# Patient Record
Sex: FEMALE | Race: BLACK OR AFRICAN AMERICAN | NOT HISPANIC OR LATINO | Employment: FULL TIME | URBAN - METROPOLITAN AREA
[De-identification: names, ages, dates, MRNs, and addresses within clinical notes are randomized per-mention and may not be internally consistent; named-entity substitution may affect disease eponyms.]

---

## 2023-08-31 DIAGNOSIS — I10 PRIMARY HYPERTENSION: Primary | ICD-10-CM

## 2023-08-31 RX ORDER — AMLODIPINE BESYLATE 5 MG/1
5 TABLET ORAL DAILY
Qty: 30 TABLET | Refills: 0 | Status: SHIPPED | OUTPATIENT
Start: 2023-08-31 | End: 2023-10-12

## 2023-10-12 ENCOUNTER — OFFICE VISIT (OUTPATIENT)
Dept: PRIMARY CARE | Facility: CLINIC | Age: 68
End: 2023-10-12
Payer: COMMERCIAL

## 2023-10-12 VITALS
TEMPERATURE: 97.7 F | HEIGHT: 61 IN | DIASTOLIC BLOOD PRESSURE: 102 MMHG | SYSTOLIC BLOOD PRESSURE: 153 MMHG | HEART RATE: 74 BPM | BODY MASS INDEX: 29.77 KG/M2 | WEIGHT: 157.7 LBS | OXYGEN SATURATION: 100 %

## 2023-10-12 DIAGNOSIS — L50.8 CHRONIC URTICARIA: ICD-10-CM

## 2023-10-12 DIAGNOSIS — Z12.31 BREAST CANCER SCREENING BY MAMMOGRAM: ICD-10-CM

## 2023-10-12 DIAGNOSIS — R04.0 EPISTAXIS: ICD-10-CM

## 2023-10-12 DIAGNOSIS — I10 PRIMARY HYPERTENSION: ICD-10-CM

## 2023-10-12 DIAGNOSIS — Z12.11 ENCOUNTER FOR SCREENING COLONOSCOPY: ICD-10-CM

## 2023-10-12 DIAGNOSIS — M79.644 PAIN OF RIGHT MIDDLE FINGER: ICD-10-CM

## 2023-10-12 DIAGNOSIS — R20.2 PARESTHESIA OF BOTH HANDS: ICD-10-CM

## 2023-10-12 DIAGNOSIS — I10 HYPERTENSION, UNSPECIFIED TYPE: Primary | ICD-10-CM

## 2023-10-12 DIAGNOSIS — E78.5 HYPERLIPIDEMIA, UNSPECIFIED HYPERLIPIDEMIA TYPE: ICD-10-CM

## 2023-10-12 PROCEDURE — 1036F TOBACCO NON-USER: CPT

## 2023-10-12 PROCEDURE — 1126F AMNT PAIN NOTED NONE PRSNT: CPT

## 2023-10-12 PROCEDURE — 1170F FXNL STATUS ASSESSED: CPT

## 2023-10-12 PROCEDURE — 3080F DIAST BP >= 90 MM HG: CPT

## 2023-10-12 PROCEDURE — 1159F MED LIST DOCD IN RCRD: CPT

## 2023-10-12 PROCEDURE — 3077F SYST BP >= 140 MM HG: CPT

## 2023-10-12 PROCEDURE — 99214 OFFICE O/P EST MOD 30 MIN: CPT

## 2023-10-12 RX ORDER — ACETAMINOPHEN 500 MG
TABLET ORAL
Qty: 3.214 KIT | Refills: 0 | Status: SHIPPED | OUTPATIENT
Start: 2023-10-12 | End: 2024-03-11 | Stop reason: SDUPTHER

## 2023-10-12 RX ORDER — AMLODIPINE BESYLATE 10 MG/1
10 TABLET ORAL DAILY
Qty: 30 TABLET | Refills: 11 | Status: SHIPPED | OUTPATIENT
Start: 2023-10-12 | End: 2024-10-11

## 2023-10-12 RX ORDER — ATORVASTATIN CALCIUM 20 MG/1
20 TABLET, FILM COATED ORAL NIGHTLY
Qty: 90 TABLET | Refills: 3 | Status: SHIPPED | OUTPATIENT
Start: 2023-10-12 | End: 2024-05-16 | Stop reason: SDUPTHER

## 2023-10-12 RX ORDER — ATORVASTATIN CALCIUM 20 MG/1
20 TABLET, FILM COATED ORAL NIGHTLY
COMMUNITY
End: 2023-10-12 | Stop reason: SDUPTHER

## 2023-10-12 RX ORDER — ARM BRACE
EACH MISCELLANEOUS
Qty: 3.214 EACH | Refills: 0 | Status: SHIPPED | OUTPATIENT
Start: 2023-10-12 | End: 2024-05-16

## 2023-10-12 RX ORDER — LEVOCETIRIZINE DIHYDROCHLORIDE 5 MG/1
5 TABLET, FILM COATED ORAL 2 TIMES DAILY PRN
Qty: 30 TABLET | Refills: 2 | Status: SHIPPED | OUTPATIENT
Start: 2023-10-12 | End: 2023-12-11

## 2023-10-12 ASSESSMENT — PAIN SCALES - GENERAL: PAINLEVEL: 0-NO PAIN

## 2023-10-12 ASSESSMENT — PATIENT HEALTH QUESTIONNAIRE - PHQ9
1. LITTLE INTEREST OR PLEASURE IN DOING THINGS: NOT AT ALL
2. FEELING DOWN, DEPRESSED OR HOPELESS: NOT AT ALL
SUM OF ALL RESPONSES TO PHQ9 QUESTIONS 1 AND 2: 0

## 2023-10-12 ASSESSMENT — ENCOUNTER SYMPTOMS
DEPRESSION: 1
ALLERGIC REACTION: 1
LOSS OF SENSATION IN FEET: 0
OCCASIONAL FEELINGS OF UNSTEADINESS: 0

## 2023-10-12 ASSESSMENT — ACTIVITIES OF DAILY LIVING (ADL)
TAKING_MEDICATION: INDEPENDENT
GROCERY_SHOPPING: INDEPENDENT
DOING_HOUSEWORK: INDEPENDENT
BATHING: INDEPENDENT
MANAGING_FINANCES: INDEPENDENT
DRESSING: INDEPENDENT

## 2023-10-12 ASSESSMENT — COLUMBIA-SUICIDE SEVERITY RATING SCALE - C-SSRS
1. IN THE PAST MONTH, HAVE YOU WISHED YOU WERE DEAD OR WISHED YOU COULD GO TO SLEEP AND NOT WAKE UP?: NO
6. HAVE YOU EVER DONE ANYTHING, STARTED TO DO ANYTHING, OR PREPARED TO DO ANYTHING TO END YOUR LIFE?: NO
2. HAVE YOU ACTUALLY HAD ANY THOUGHTS OF KILLING YOURSELF?: NO

## 2023-10-12 NOTE — PROGRESS NOTES
"Subjective   Patient ID: Jaky Kaufman is a 68 y.o. female w/ Hx of chronic urticaria/angioedema, HTN, hyperlipidemia who presents for Epistaxis (Nose Bleed), Allergic Reaction, and Med Refill (Discuss medications).    Epistaxis (Nose Bleed)     Allergic Reaction    Med Refill       #HTN  - BP /102  - Currently taking amlodipine 5mg daily   - Not checking BP at home, requesting monitor kit   - Denies sx     #Epistaxis   - for past 2 months   - used to get every other day, no specific trigger, random onset, each episode last for 2-3 minutes, stopped with pinching and use of klenex. Now is becoming less frequent, last episode a week ago.   - Bright red blood, no clots   - Occasional headache but not always   - reports of dry nose and congestion (uses OTC nasal spray/vicks)   - No trauma/injury     #Chronic urticaria   - last seen allergist in Jan 2022   - \"Chronic urticaria and Angioedema: Chronic Urticaria: We discussed that the cause of chronic urticaria is immune-mediated, in some cases caused by antibodies targeted against self IgE-receptor or self-IgE. Chronic urticaria is less likely to be caused by environmental or food allergy. Triggers can include use of NSAIDS, alcohol, infection, stress, etc. Recommend starting Levocetirtizine 5 mg BID, Pepcid 20 mg BID, and may use Hydroxyzine 25-50 mg every 6-8 hours as needed for breakthrough hives. We also discussed that she should should stay up to date with age-appropriate cancer screenings, as underlying malignancy can also be a cause of urticaria. I have also counseled that she should avoid NSAIDS, aspirin, and narcotic pain medications as these medications can worsen or prolong hives through the mechanism of non-specific mast cell degranulation. She will discontinue use of Aleve. Will check labs to include CBC, CMP, TSH, CU index, serum tryptase.   Chronic rhinitis: Will check environmental aeroallergens specific IgE panel to further evaluate. May start " "Flonase 2 sprays each nostril once daily  We would like to see her in follow up in 3 months or sooner if needed\"  Pt didn't follow up as recommended.   Pt not taking any of the above medication as recommended   Endorses itchiness on the roof of her mouth   Chronic itchiness (generalised)   No new rash/hives   Denies lip swelling/SOB     #Right middle finger pain   Intermittent tingling sensation in her finger (both hands, right >left, intermittent pain in her right middle finger with mild swelling). No numbness/change in colour. No injury/trauma.     #HM  - Colonoscopy in 2018 with multiple polyp. Recommended repeat in 3 years, past over due   - Mammogram in 2021 -> wnl. Due for repeat   - Due for PAP   - UTD flu shot     ROS:  12pt ROS negative except as mentioned in HPI     Objective   BP (!) 153/102 (BP Location: Right arm, Patient Position: Sitting, BP Cuff Size: Adult)   Pulse 74   Temp 36.5 °C (97.7 °F) (Temporal)   Ht 1.54 m (5' 0.63\")   Wt 71.5 kg (157 lb 11.2 oz)   SpO2 100%   BMI 30.16 kg/m²     Physical Exam  Constitutional:       Appearance: Normal appearance.   HENT:      Mouth/Throat:      Mouth: Mucous membranes are moist.      Comments: No erythema/swelling/lesion  Eyes:      Extraocular Movements: Extraocular movements intact.      Conjunctiva/sclera: Conjunctivae normal.   Cardiovascular:      Rate and Rhythm: Normal rate and regular rhythm.      Pulses: Normal pulses.      Heart sounds: Normal heart sounds.   Pulmonary:      Effort: Pulmonary effort is normal.      Breath sounds: Normal breath sounds.   Abdominal:      General: Abdomen is flat. Bowel sounds are normal.      Palpations: Abdomen is soft.   Musculoskeletal:      Cervical back: Normal range of motion.      Comments: Right middle finger: mild swelling, no redness/tenderness on palpation. Good strength but unable to fully close the fist.       Skin:     General: Skin is warm.   Neurological:      Mental Status: She is alert and " oriented to person, place, and time.        Assessment/Plan   Jaky Kaufman is a 68 y.o. female w/ Hx of chronic urticaria/angioedema, HTN, hyperlipidemia who presents for multiple complaints. Clinically stable. Plan as below:     #HTN  - BP above target range  - Increased dose of amlodipine to 10mg daily  - BP monitor ordered, advised to check BP regularly at home   - Discussed diet and exercise   - labs ordered including CMP, urine albumin     #Epistaxis   - Not on blood thinner, no trauma  - Could be in the setting of poorly controlled HTN vs dry nose   - Episodes are becoming less frequent, easily controlled (lasting less than 5 minutes)   - Advised to use humidifier to keep nose moist   - Stay hydrated   - Will order CBC to check Hgb level   - Return precautions discussed     #Chronic urticaria   - no acute concern  - Pt needs to follow up with allergy specialist, referral provided  - Ordered levocetirizine 5mg BID for PRN use for chronic pruritus   - CMP ordered to check BUN/LFT to r/o other causes of generalised pruritus     #Right middle finger pain/tingling sensation  - Paresthesia in both hands but intermittent sharp pain only present at right middle finger  - No tenderness on palpation today, no warmth or erythema to suggest acute joint infection or inflammation   - Unlikely trigger finger  - Pt's work involves physical lifting, sx could be related to carpal tunnel syndrome. Ordered wrist brace. Will continue to monitor sx     #HM  - Ordered colonoscopy and mammogram     RTC for HMV, PAP and BP review.      Seen and discussed with Dr Naomy Medellin MD  Family Medicine PGY2     Problem List Items Addressed This Visit    None  Visit Diagnoses         Codes    Hypertension, unspecified type    -  Primary I10    Relevant Medications    blood pressure monitor (Blood Pressure Kit) kit    amLODIPine (Norvasc) 10 mg tablet    Other Relevant Orders    Referral to Ophthalmology    Comprehensive metabolic  panel    Albumin, urine, random    Primary hypertension     I10    Encounter for screening colonoscopy     Z12.11    Relevant Orders    Colonoscopy Screening    Breast cancer screening by mammogram     Z12.31    Relevant Orders    BI mammo bilateral screening tomosynthesis    Pain of right middle finger     M79.644    Relevant Medications    arm brace (Wrist Brace) misc    Paresthesia of both hands     R20.2    Relevant Orders    Vitamin B12    Epistaxis     R04.0    Chronic urticaria     L50.8    Relevant Medications    levocetirizine (Xyzal) 5 mg tablet    Other Relevant Orders    CBC and Auto Differential    Comprehensive metabolic panel    Referral to Allergy    Hyperlipidemia, unspecified hyperlipidemia type     E78.5    Relevant Medications    atorvastatin (Lipitor) 20 mg tablet    Other Relevant Orders    Lipid panel

## 2023-10-13 NOTE — PROGRESS NOTES
I saw and evaluated the patient. I personally obtained the key and critical portions of the history and physical exam or was physically present for key and critical portions performed by the resident/fellow. I reviewed the resident/fellow's documentation and discussed the patient with the resident/fellow. I agree with the resident/fellow's medical decision making as documented in the note.    Miguel Moralez MD

## 2023-10-23 ENCOUNTER — LAB (OUTPATIENT)
Dept: LAB | Facility: LAB | Age: 68
End: 2023-10-23
Payer: COMMERCIAL

## 2023-10-23 DIAGNOSIS — E78.5 HYPERLIPIDEMIA, UNSPECIFIED HYPERLIPIDEMIA TYPE: ICD-10-CM

## 2023-10-23 DIAGNOSIS — I10 HYPERTENSION, UNSPECIFIED TYPE: ICD-10-CM

## 2023-10-23 DIAGNOSIS — R20.2 PARESTHESIA OF BOTH HANDS: ICD-10-CM

## 2023-10-23 DIAGNOSIS — L50.8 CHRONIC URTICARIA: ICD-10-CM

## 2023-10-23 LAB
ALBUMIN SERPL BCP-MCNC: 4.2 G/DL (ref 3.4–5)
ALP SERPL-CCNC: 121 U/L (ref 33–136)
ALT SERPL W P-5'-P-CCNC: 11 U/L (ref 7–45)
ANION GAP SERPL CALC-SCNC: 12 MMOL/L (ref 10–20)
AST SERPL W P-5'-P-CCNC: 22 U/L (ref 9–39)
BASOPHILS # BLD AUTO: 0.03 X10*3/UL (ref 0–0.1)
BASOPHILS NFR BLD AUTO: 0.6 %
BILIRUB SERPL-MCNC: 0.6 MG/DL (ref 0–1.2)
BUN SERPL-MCNC: 12 MG/DL (ref 6–23)
CALCIUM SERPL-MCNC: 9.7 MG/DL (ref 8.6–10.6)
CHLORIDE SERPL-SCNC: 107 MMOL/L (ref 98–107)
CHOLEST SERPL-MCNC: 161 MG/DL (ref 0–199)
CHOLESTEROL/HDL RATIO: 3
CO2 SERPL-SCNC: 30 MMOL/L (ref 21–32)
CREAT SERPL-MCNC: 0.81 MG/DL (ref 0.5–1.05)
CREAT UR-MCNC: 224 MG/DL (ref 20–320)
EOSINOPHIL # BLD AUTO: 0.24 X10*3/UL (ref 0–0.7)
EOSINOPHIL NFR BLD AUTO: 4.5 %
ERYTHROCYTE [DISTWIDTH] IN BLOOD BY AUTOMATED COUNT: 15.6 % (ref 11.5–14.5)
GFR SERPL CREATININE-BSD FRML MDRD: 80 ML/MIN/1.73M*2
GLUCOSE SERPL-MCNC: 84 MG/DL (ref 74–99)
HCT VFR BLD AUTO: 39.3 % (ref 36–46)
HDLC SERPL-MCNC: 54.1 MG/DL
HGB BLD-MCNC: 12.1 G/DL (ref 12–16)
IMM GRANULOCYTES # BLD AUTO: 0.01 X10*3/UL (ref 0–0.7)
IMM GRANULOCYTES NFR BLD AUTO: 0.2 % (ref 0–0.9)
LDLC SERPL CALC-MCNC: 96 MG/DL
LYMPHOCYTES # BLD AUTO: 2.2 X10*3/UL (ref 1.2–4.8)
LYMPHOCYTES NFR BLD AUTO: 41.1 %
MCH RBC QN AUTO: 27 PG (ref 26–34)
MCHC RBC AUTO-ENTMCNC: 30.8 G/DL (ref 32–36)
MCV RBC AUTO: 88 FL (ref 80–100)
MICROALBUMIN UR-MCNC: 17.5 MG/L
MICROALBUMIN/CREAT UR: 7.8 UG/MG CREAT
MONOCYTES # BLD AUTO: 0.42 X10*3/UL (ref 0.1–1)
MONOCYTES NFR BLD AUTO: 7.9 %
NEUTROPHILS # BLD AUTO: 2.45 X10*3/UL (ref 1.2–7.7)
NEUTROPHILS NFR BLD AUTO: 45.7 %
NON HDL CHOLESTEROL: 107 MG/DL (ref 0–149)
NRBC BLD-RTO: 0 /100 WBCS (ref 0–0)
PLATELET # BLD AUTO: 278 X10*3/UL (ref 150–450)
PMV BLD AUTO: 9.5 FL (ref 7.5–11.5)
POTASSIUM SERPL-SCNC: 4.1 MMOL/L (ref 3.5–5.3)
PROT SERPL-MCNC: 7.6 G/DL (ref 6.4–8.2)
RBC # BLD AUTO: 4.48 X10*6/UL (ref 4–5.2)
SODIUM SERPL-SCNC: 145 MMOL/L (ref 136–145)
TRIGL SERPL-MCNC: 57 MG/DL (ref 0–149)
VIT B12 SERPL-MCNC: 708 PG/ML (ref 211–911)
VLDL: 11 MG/DL (ref 0–40)
WBC # BLD AUTO: 5.4 X10*3/UL (ref 4.4–11.3)

## 2023-10-23 PROCEDURE — 82607 VITAMIN B-12: CPT

## 2023-10-23 PROCEDURE — 80061 LIPID PANEL: CPT

## 2023-10-23 PROCEDURE — 85025 COMPLETE CBC W/AUTO DIFF WBC: CPT

## 2023-10-23 PROCEDURE — 36415 COLL VENOUS BLD VENIPUNCTURE: CPT

## 2023-10-23 PROCEDURE — 82043 UR ALBUMIN QUANTITATIVE: CPT

## 2023-10-23 PROCEDURE — 82570 ASSAY OF URINE CREATININE: CPT

## 2023-10-23 PROCEDURE — 80053 COMPREHEN METABOLIC PANEL: CPT

## 2023-10-26 ENCOUNTER — HOSPITAL ENCOUNTER (OUTPATIENT)
Dept: RADIOLOGY | Facility: HOSPITAL | Age: 68
Discharge: HOME | End: 2023-10-26
Payer: COMMERCIAL

## 2023-10-26 DIAGNOSIS — Z12.31 BREAST CANCER SCREENING BY MAMMOGRAM: ICD-10-CM

## 2023-10-26 PROCEDURE — 77067 SCR MAMMO BI INCL CAD: CPT

## 2023-10-26 PROCEDURE — 77063 BREAST TOMOSYNTHESIS BI: CPT | Performed by: RADIOLOGY

## 2023-10-26 PROCEDURE — 77067 SCR MAMMO BI INCL CAD: CPT | Performed by: RADIOLOGY

## 2023-11-15 ENCOUNTER — OFFICE VISIT (OUTPATIENT)
Dept: ALLERGY | Facility: HOSPITAL | Age: 68
End: 2023-11-15
Payer: COMMERCIAL

## 2023-11-15 VITALS
RESPIRATION RATE: 20 BRPM | BODY MASS INDEX: 30.16 KG/M2 | OXYGEN SATURATION: 100 % | WEIGHT: 157.7 LBS | DIASTOLIC BLOOD PRESSURE: 80 MMHG | HEART RATE: 84 BPM | SYSTOLIC BLOOD PRESSURE: 115 MMHG | TEMPERATURE: 97.6 F

## 2023-11-15 DIAGNOSIS — L50.8 CHRONIC URTICARIA: ICD-10-CM

## 2023-11-15 DIAGNOSIS — J31.0 CHRONIC RHINITIS: Primary | ICD-10-CM

## 2023-11-15 PROCEDURE — 99214 OFFICE O/P EST MOD 30 MIN: CPT | Performed by: ALLERGY & IMMUNOLOGY

## 2023-11-15 PROCEDURE — 1126F AMNT PAIN NOTED NONE PRSNT: CPT | Performed by: ALLERGY & IMMUNOLOGY

## 2023-11-15 PROCEDURE — 1159F MED LIST DOCD IN RCRD: CPT | Performed by: ALLERGY & IMMUNOLOGY

## 2023-11-15 PROCEDURE — 1036F TOBACCO NON-USER: CPT | Performed by: ALLERGY & IMMUNOLOGY

## 2023-11-15 RX ORDER — FLUTICASONE PROPIONATE 50 MCG
2 SPRAY, SUSPENSION (ML) NASAL DAILY
Qty: 16 G | Refills: 11 | Status: SHIPPED | OUTPATIENT
Start: 2023-11-15 | End: 2024-11-14

## 2023-11-15 RX ORDER — HYDROCORTISONE ACETATE PRAMOXINE HCL 2.5; 1 G/100G; G/100G
CREAM TOPICAL
COMMUNITY
Start: 2018-07-16

## 2023-11-15 RX ORDER — FLUTICASONE PROPIONATE 50 MCG
2 SPRAY, SUSPENSION (ML) NASAL DAILY
COMMUNITY
Start: 2022-01-12

## 2023-11-15 RX ORDER — AMLODIPINE BESYLATE 5 MG/1
5 TABLET ORAL DAILY
COMMUNITY
End: 2023-11-15 | Stop reason: ALTCHOICE

## 2023-11-15 RX ORDER — ASPIRIN 81 MG/1
TABLET ORAL
COMMUNITY
Start: 2018-07-16 | End: 2023-11-15 | Stop reason: ALTCHOICE

## 2023-11-15 RX ORDER — FAMOTIDINE 20 MG/1
1 TABLET, FILM COATED ORAL 2 TIMES DAILY
COMMUNITY
Start: 2020-10-14

## 2023-11-15 RX ORDER — MINERAL OIL
180 ENEMA (ML) RECTAL DAILY
Qty: 30 TABLET | Refills: 11 | Status: SHIPPED | OUTPATIENT
Start: 2023-11-15 | End: 2024-11-14

## 2023-11-15 ASSESSMENT — ENCOUNTER SYMPTOMS
NUMBNESS: 1
RHINORRHEA: 1
ALLERGIC/IMMUNOLOGIC NEGATIVE: 1
GASTROINTESTINAL NEGATIVE: 1
EYES NEGATIVE: 1
CONSTITUTIONAL NEGATIVE: 1
RESPIRATORY NEGATIVE: 1
HEMATOLOGIC/LYMPHATIC NEGATIVE: 1
MUSCULOSKELETAL NEGATIVE: 1
CARDIOVASCULAR NEGATIVE: 1

## 2023-11-15 NOTE — PROGRESS NOTES
Jaky Kaufman presents for follow up evaluation today.  She was last seen for chronic urticaria and chronic rhinitis in January 2022.      She provide the following history:    She states that she is itchy all the time.  She notes that she rarely gets hives now, but feels that her skin is always itchy, predominantly on her arms and legs.  She scratches until she scars.  She is using Dove soap, and all Free and clear laundry detergent.  She uses a cream moisturizer for which she cannot recall the name.  She used to take Aleve on occasion but now has switched to Tylenol.  She does not think that increasing her blood pressure medication worsened her itching.  She is on levocetirizine, however makes her somewhat drowsy.  She does not recall using Allegra in the past.  She is out of hydroxyzine.  She also notes that ingestion of fresh pineapple and fresh tomatoes bother her mouth.  She is able to tolerate canned pineapple and cooked tomato.  And overall she notes that she has a runny nose all of this time.    Review of Systems   Constitutional: Negative.    HENT:  Positive for rhinorrhea.    Eyes: Negative.    Respiratory: Negative.     Cardiovascular: Negative.    Gastrointestinal: Negative.    Musculoskeletal: Negative.    Skin: Negative.    Allergic/Immunologic: Negative.    Neurological:  Positive for numbness.        Tingling sensation up her legs   Hematological: Negative.        Vital signs:  /80 (BP Location: Right arm, Patient Position: Sitting)   Pulse 84   Temp 36.4 °C (97.6 °F)   Resp 20   Wt 71.5 kg (157 lb 11.2 oz)   SpO2 100%   BMI 30.16 kg/m²     GENERAL: Alert, oriented and in no acute distress.     HEENT: EYES: No conjunctival injection or cobblestoning. Nose: nasal turbinates are edematous bilaterally.  There is no mucous stranding, polyps, or blood    noted. EARS: Tympanic membranes are clear. MOUTH: moist and pink with no exudates, ulcers, or thrush. NECK: is supple, without adenopathy.   No upper airway stridor noted.       HEART: regular rate and rhythm.       LUNGS: Clear to auscultation bilaterally. No wheezing, rhonchi or rales.        ABDOMEN: Positive bowel sounds, soft, nontender, nondistended.       EXTREMITIES: No clubbing or edema.        SKIN: No rash, hives, or angioedema noted    Impression:   1. Chronic rhinitis    2. Chronic urticaria       Assessment and plan:    Chronic urticaria and chronic pruritus: Autoimmune with positive CU index (1/2022).  Recommend starting fexofenadine 180 mg daily, and may use levocetirizine 5 mg in the evening as needed.  Recommend using Dove sensitive skin soap and CeraVe healing ointment.  Recommend staying up-to-date with age-appropriate cancer screenings.    Chronic rhinitis:  Respiratory allergy specific IgE panel was negative in 1/2022.  Discussed that she has nonallergic rhinitis.  Restart Flonase 2 sprays each nostril once daily.  We reviewed proper nasal spray administration technique    Plan for follow-up in 6 months or sooner if needed

## 2023-11-15 NOTE — PATIENT INSTRUCTIONS
It was to see you today!    You can take Allegra (Fexofenadine) 180 mg once daily in the morning     Take Levocetirizine 5 mg once daily in the evening     You may use Flonase 2 sprays each nostril once daily    Use Dove Sensitive Skin Soap and CeraVe healing ointment     We would like to see you in follow up in 6 months or sooner if needed

## 2023-11-15 NOTE — LETTER
November 15, 2023     Dick Medellin MD  [id number] [facility] [id number]    Patient: Jaky Kaufman   YOB: 1955   Date of Visit: 11/15/2023       Dear Dr. Dick Medellin MD:    Thank you for referring Jaky Kaufman to me for evaluation. Below are my notes for this consultation.  If you have questions, please do not hesitate to call me. I look forward to following your patient along with you.       Sincerely,     Princess SOCO Garnett MD      CC: No Recipients  ______________________________________________________________________________________    Jaky Kaufman presents for follow up evaluation today.  She was last seen for chronic urticaria and chronic rhinitis in January 2022.      She provide the following history:    She states that she is itchy all the time.  She notes that she rarely gets hives now, but feels that her skin is always itchy, predominantly on her arms and legs.  She scratches until she scars.  She is using Dove soap, and all Free and clear laundry detergent.  She uses a cream moisturizer for which she cannot recall the name.  She used to take Aleve on occasion but now has switched to Tylenol.  She does not think that increasing her blood pressure medication worsened her itching.  She is on levocetirizine, however makes her somewhat drowsy.  She does not recall using Allegra in the past.  She is out of hydroxyzine.  She also notes that ingestion of fresh pineapple and fresh tomatoes bother her mouth.  She is able to tolerate canned pineapple and cooked tomato.  And overall she notes that she has a runny nose all of this time.    Review of Systems   Constitutional: Negative.    HENT:  Positive for rhinorrhea.    Eyes: Negative.    Respiratory: Negative.     Cardiovascular: Negative.    Gastrointestinal: Negative.    Musculoskeletal: Negative.    Skin: Negative.    Allergic/Immunologic: Negative.    Neurological:  Positive for numbness.         Tingling sensation up her legs   Hematological: Negative.        Vital signs:  /80 (BP Location: Right arm, Patient Position: Sitting)   Pulse 84   Temp 36.4 °C (97.6 °F)   Resp 20   Wt 71.5 kg (157 lb 11.2 oz)   SpO2 100%   BMI 30.16 kg/m²     GENERAL: Alert, oriented and in no acute distress.     HEENT: EYES: No conjunctival injection or cobblestoning. Nose: nasal turbinates are edematous bilaterally.  There is no mucous stranding, polyps, or blood    noted. EARS: Tympanic membranes are clear. MOUTH: moist and pink with no exudates, ulcers, or thrush. NECK: is supple, without adenopathy.  No upper airway stridor noted.       HEART: regular rate and rhythm.       LUNGS: Clear to auscultation bilaterally. No wheezing, rhonchi or rales.        ABDOMEN: Positive bowel sounds, soft, nontender, nondistended.       EXTREMITIES: No clubbing or edema.        SKIN: No rash, hives, or angioedema noted    Impression:   1. Chronic rhinitis    2. Chronic urticaria       Assessment and plan:    Chronic urticaria and chronic pruritus: Autoimmune with positive CU index (1/2022).  Recommend starting fexofenadine 180 mg daily, and may use levocetirizine 5 mg in the evening as needed.  Recommend using Dove sensitive skin soap and CeraVe healing ointment.  Recommend staying up-to-date with age-appropriate cancer screenings.    Chronic rhinitis:  Respiratory allergy specific IgE panel was negative in 1/2022.  Discussed that she has nonallergic rhinitis.  Restart Flonase 2 sprays each nostril once daily.  We reviewed proper nasal spray administration technique    Plan for follow-up in 6 months or sooner if needed

## 2023-11-28 DIAGNOSIS — I10 ESSENTIAL (PRIMARY) HYPERTENSION: ICD-10-CM

## 2023-11-28 RX ORDER — AMLODIPINE BESYLATE 5 MG/1
5 TABLET ORAL DAILY
Qty: 90 TABLET | Refills: 1 | OUTPATIENT
Start: 2023-11-28

## 2024-02-26 ENCOUNTER — OFFICE VISIT (OUTPATIENT)
Dept: OPHTHALMOLOGY | Facility: CLINIC | Age: 69
End: 2024-02-26
Payer: COMMERCIAL

## 2024-02-26 DIAGNOSIS — H52.203 MYOPIA OF BOTH EYES WITH ASTIGMATISM AND PRESBYOPIA: ICD-10-CM

## 2024-02-26 DIAGNOSIS — H40.053 ELEVATED IOP, BILATERAL: Primary | ICD-10-CM

## 2024-02-26 DIAGNOSIS — H52.13 MYOPIA OF BOTH EYES WITH ASTIGMATISM AND PRESBYOPIA: ICD-10-CM

## 2024-02-26 DIAGNOSIS — H52.4 MYOPIA OF BOTH EYES WITH ASTIGMATISM AND PRESBYOPIA: ICD-10-CM

## 2024-02-26 DIAGNOSIS — H43.811 POSTERIOR VITREOUS DETACHMENT OF RIGHT EYE: ICD-10-CM

## 2024-02-26 DIAGNOSIS — H25.813 COMBINED FORM OF AGE-RELATED CATARACT, BOTH EYES: ICD-10-CM

## 2024-02-26 DIAGNOSIS — H53.453 LOSS OF PERIPHERAL VISUAL FIELD, BILATERAL: ICD-10-CM

## 2024-02-26 PROCEDURE — 99203 OFFICE O/P NEW LOW 30 MIN: CPT | Performed by: OPTOMETRIST

## 2024-02-26 PROCEDURE — 76514 ECHO EXAM OF EYE THICKNESS: CPT | Performed by: OPTOMETRIST

## 2024-02-26 PROCEDURE — 92083 EXTENDED VISUAL FIELD XM: CPT | Performed by: OPTOMETRIST

## 2024-02-26 PROCEDURE — 92015 DETERMINE REFRACTIVE STATE: CPT | Performed by: OPTOMETRIST

## 2024-02-26 PROCEDURE — 92134 CPTRZ OPH DX IMG PST SGM RTA: CPT | Performed by: OPTOMETRIST

## 2024-02-26 ASSESSMENT — REFRACTION_MANIFEST
METHOD_AUTOREFRACTION: 1
OD_SPHERE: -1.50
OD_ADD: +2.50
OS_AXIS: 090
OS_AXIS: 090
OD_CYLINDER: -0.50
OS_CYLINDER: -0.75
OD_SPHERE: -1.25
OD_AXIS: 085
OS_SPHERE: -1.50
OS_ADD: +2.50
OS_CYLINDER: -0.50
OD_AXIS: 095
OS_SPHERE: -1.50
OD_CYLINDER: -0.25

## 2024-02-26 ASSESSMENT — CONF VISUAL FIELD
METHOD: COUNTING FINGERS
OD_INFERIOR_TEMPORAL_RESTRICTION: 0
OD_SUPERIOR_TEMPORAL_RESTRICTION: 0
OS_INFERIOR_NASAL_RESTRICTION: 1
OD_SUPERIOR_NASAL_RESTRICTION: 0
OD_INFERIOR_NASAL_RESTRICTION: 1
OS_INFERIOR_TEMPORAL_RESTRICTION: 1
OS_SUPERIOR_NASAL_RESTRICTION: 0
OS_SUPERIOR_TEMPORAL_RESTRICTION: 0

## 2024-02-26 ASSESSMENT — VISUAL ACUITY
OD_SC: 20/150
OS_PH_SC: 20/50+2
OD_BAT_HIGH: 20/40-
OS_SC: 20/70-
OD_PH_SC: 20/40
METHOD: SNELLEN - LINEAR
OS_BAT_HIGH: 20/40-

## 2024-02-26 ASSESSMENT — ENCOUNTER SYMPTOMS
CARDIOVASCULAR NEGATIVE: 1
ENDOCRINE NEGATIVE: 0
RESPIRATORY NEGATIVE: 0
NEUROLOGICAL NEGATIVE: 0
MUSCULOSKELETAL NEGATIVE: 0
CONSTITUTIONAL NEGATIVE: 0
EYES NEGATIVE: 0
GASTROINTESTINAL NEGATIVE: 0
HEMATOLOGIC/LYMPHATIC NEGATIVE: 0
ALLERGIC/IMMUNOLOGIC NEGATIVE: 0
PSYCHIATRIC NEGATIVE: 0

## 2024-02-26 ASSESSMENT — CUP TO DISC RATIO
OS_RATIO: 0.3
OD_RATIO: 0.3

## 2024-02-26 ASSESSMENT — PACHYMETRY
OD_CT(UM): 581
OS_CT(UM): 584

## 2024-02-26 ASSESSMENT — SLIT LAMP EXAM - LIDS
COMMENTS: NORMAL
COMMENTS: NORMAL

## 2024-02-26 ASSESSMENT — EXTERNAL EXAM - RIGHT EYE: OD_EXAM: NORMAL

## 2024-02-26 ASSESSMENT — TONOMETRY
IOP_METHOD: GOLDMANN APPLANATION
OD_IOP_MMHG: 24,22
OS_IOP_MMHG: 24,22

## 2024-02-26 ASSESSMENT — EXTERNAL EXAM - LEFT EYE: OS_EXAM: NORMAL

## 2024-02-26 NOTE — PROGRESS NOTES
[facility] 20/25-2 OD/OS BAT 20/40, 20/40 Cataract present    New onset floater OD. Palma ring and PVD present OD.     Optical coherence tomography of the macula revealed:   OD: Normal foveal contour, photoreceptor, retinal pigment epithelium, IS/OS junction, central field 231 micron.  Vitreous hyaloid base not visualized due to PVD.  Findings are c/w PVD.  OS:  Normal foveal contour, photoreceptor, retinal pigment epithelium, IS/OS junction, central field 236 micron.  Vitreous hyaloid base not visualized due to quality.  Findings are normal.    Apparent VF loss and elevated IOP.   A Vee 24-2 threshold visual field test was done.  Results were:  OD: superior altitudinal defect with inferior temporal paracentral defect, pattern standard deviation 11.03 dB, mean deviation -15.64 dB, 16/17 fixation losses  OS: superior altitudinal defect with inferior nasal step, pattern standard deviation 7.52 dB, mean deviation -18.97 dB, 16/18 fixation losses  However, poor test reliability OD/OS    IOP elevated 24/24 pachy adjust -3 OU.     Optical coherence tomography of the retinal nerve fiber layer (RNFL) revealed:   OD: Normal thickness in SN sectors, borderline thinning IT with an average RNFL thickness of 74 micron.  OS: Normal thickness in all four sectors with an average RNFL thickness of 84 micron.     The patient was asked to return to our clinic or seek out eye care ASAP if new flashes of light or floaters are noted.      Rtc 5 weeks for IOP and DFE.    RTC 4 months for [facility] IOP and OCT RNFL.

## 2024-03-05 ENCOUNTER — TELEPHONE (OUTPATIENT)
Dept: PRIMARY CARE | Facility: CLINIC | Age: 69
End: 2024-03-05
Payer: COMMERCIAL

## 2024-03-11 ENCOUNTER — OFFICE VISIT (OUTPATIENT)
Dept: PRIMARY CARE | Facility: CLINIC | Age: 69
End: 2024-03-11
Payer: COMMERCIAL

## 2024-03-11 ENCOUNTER — OFFICE VISIT (OUTPATIENT)
Dept: OPHTHALMOLOGY | Facility: CLINIC | Age: 69
End: 2024-03-11
Payer: COMMERCIAL

## 2024-03-11 ENCOUNTER — LAB (OUTPATIENT)
Dept: LAB | Facility: LAB | Age: 69
End: 2024-03-11
Payer: COMMERCIAL

## 2024-03-11 VITALS
DIASTOLIC BLOOD PRESSURE: 81 MMHG | TEMPERATURE: 96.9 F | SYSTOLIC BLOOD PRESSURE: 147 MMHG | HEART RATE: 71 BPM | HEIGHT: 61 IN | BODY MASS INDEX: 29.45 KG/M2 | WEIGHT: 156 LBS | OXYGEN SATURATION: 100 %

## 2024-03-11 DIAGNOSIS — H52.13 MYOPIA OF BOTH EYES WITH ASTIGMATISM AND PRESBYOPIA: ICD-10-CM

## 2024-03-11 DIAGNOSIS — H25.813 COMBINED FORM OF AGE-RELATED CATARACT, BOTH EYES: ICD-10-CM

## 2024-03-11 DIAGNOSIS — L84 CORN OF TOE: ICD-10-CM

## 2024-03-11 DIAGNOSIS — H53.453 LOSS OF PERIPHERAL VISUAL FIELD, BILATERAL: ICD-10-CM

## 2024-03-11 DIAGNOSIS — M54.41 CHRONIC MIDLINE LOW BACK PAIN WITH RIGHT-SIDED SCIATICA: ICD-10-CM

## 2024-03-11 DIAGNOSIS — H52.4 MYOPIA OF BOTH EYES WITH ASTIGMATISM AND PRESBYOPIA: ICD-10-CM

## 2024-03-11 DIAGNOSIS — H43.811 POSTERIOR VITREOUS DETACHMENT OF RIGHT EYE: Primary | ICD-10-CM

## 2024-03-11 DIAGNOSIS — G89.29 CHRONIC MIDLINE LOW BACK PAIN WITH RIGHT-SIDED SCIATICA: ICD-10-CM

## 2024-03-11 DIAGNOSIS — R73.03 PREDIABETES: ICD-10-CM

## 2024-03-11 DIAGNOSIS — H52.203 MYOPIA OF BOTH EYES WITH ASTIGMATISM AND PRESBYOPIA: ICD-10-CM

## 2024-03-11 DIAGNOSIS — H40.053 ELEVATED IOP, BILATERAL: ICD-10-CM

## 2024-03-11 DIAGNOSIS — I10 HYPERTENSION, UNSPECIFIED TYPE: ICD-10-CM

## 2024-03-11 DIAGNOSIS — F43.21 ADJUSTMENT DISORDER WITH DEPRESSED MOOD: Primary | ICD-10-CM

## 2024-03-11 LAB
EST. AVERAGE GLUCOSE BLD GHB EST-MCNC: 114 MG/DL
HBA1C MFR BLD: 5.6 %

## 2024-03-11 PROCEDURE — 83036 HEMOGLOBIN GLYCOSYLATED A1C: CPT

## 2024-03-11 PROCEDURE — 3079F DIAST BP 80-89 MM HG: CPT

## 2024-03-11 PROCEDURE — 1160F RVW MEDS BY RX/DR IN RCRD: CPT

## 2024-03-11 PROCEDURE — 1125F AMNT PAIN NOTED PAIN PRSNT: CPT

## 2024-03-11 PROCEDURE — 92014 COMPRE OPH EXAM EST PT 1/>: CPT | Performed by: OPTOMETRIST

## 2024-03-11 PROCEDURE — 99214 OFFICE O/P EST MOD 30 MIN: CPT

## 2024-03-11 PROCEDURE — 3077F SYST BP >= 140 MM HG: CPT

## 2024-03-11 PROCEDURE — 1159F MED LIST DOCD IN RCRD: CPT

## 2024-03-11 PROCEDURE — 1036F TOBACCO NON-USER: CPT

## 2024-03-11 PROCEDURE — 36415 COLL VENOUS BLD VENIPUNCTURE: CPT

## 2024-03-11 RX ORDER — ACETAMINOPHEN 500 MG
TABLET ORAL
Qty: 10.331 EACH | Refills: 0 | Status: SHIPPED | OUTPATIENT
Start: 2024-03-11

## 2024-03-11 ASSESSMENT — CONF VISUAL FIELD
OD_INFERIOR_NASAL_RESTRICTION: 1
METHOD: COUNTING FINGERS
OS_INFERIOR_NASAL_RESTRICTION: 1
OS_INFERIOR_TEMPORAL_RESTRICTION: 1

## 2024-03-11 ASSESSMENT — ENCOUNTER SYMPTOMS
RESPIRATORY NEGATIVE: 0
EYES NEGATIVE: 0
ENDOCRINE NEGATIVE: 0
LOSS OF SENSATION IN FEET: 0
MUSCULOSKELETAL NEGATIVE: 0
HEMATOLOGIC/LYMPHATIC NEGATIVE: 0
GASTROINTESTINAL NEGATIVE: 0
PSYCHIATRIC NEGATIVE: 0
CONSTITUTIONAL NEGATIVE: 0
DEPRESSION: 1
NEUROLOGICAL NEGATIVE: 0
OCCASIONAL FEELINGS OF UNSTEADINESS: 0
CARDIOVASCULAR NEGATIVE: 0
ALLERGIC/IMMUNOLOGIC NEGATIVE: 0

## 2024-03-11 ASSESSMENT — PACHYMETRY
OS_CT(UM): 584
OD_CT(UM): 581

## 2024-03-11 ASSESSMENT — TONOMETRY
OS_IOP_MMHG: 22
OD_IOP_MMHG: 23
IOP_METHOD: GOLDMANN APPLANATION

## 2024-03-11 ASSESSMENT — VISUAL ACUITY
OD_SC+: -2
METHOD: SNELLEN - LINEAR
OS_SC: 20/50
OS_SC+: +1
OD_SC: 20/60

## 2024-03-11 ASSESSMENT — PATIENT HEALTH QUESTIONNAIRE - PHQ9: 1. LITTLE INTEREST OR PLEASURE IN DOING THINGS: NOT AT ALL

## 2024-03-11 ASSESSMENT — PAIN SCALES - GENERAL: PAINLEVEL: 8

## 2024-03-11 NOTE — PROGRESS NOTES
Subjective   Patient ID: Jaky Kaufman is a 68 y.o. female with PMH of HTN, HLD, & chronic rhinitis who presents for Follow-up.    #HTN   - taking amlodipine 10 mg daily   - has not been measuring BP at home, could not afford to pay out of pocket for the prescribed BP kit   - willing to track BP at home  - under significant stress (daughter recently diagnosed with cancer)  - otherwise asx     #Acute stress  - in past month, found out daughter was diagnosed with stage 4 lung cancer at age 43, she's starting to undergo treatment  - has been feeling very sad and depressed about the situation  - family is a very strong support system, has been coping with stress through prayer  - has not changed substance use habits  - is able to find jagdish and meaning in life, particularly when thinking about her many grandchildren  - interested in therapy/support groups, would like a referral     #foot pain  - has corns on both pinky toes that are very painful  - has tried changing shoes without improvement  - wants to see podiatry  - also having some numbness/tingling in toes     #chronic back pain  - chronic history of sciatica after a fall   - works in cleaning services, bends over and lifts things often  - shooting pain that radiate down R leg  - pain is intermittent     #HMV  - Pap due but deferred   - Mammo 10/26/2023: normal, repeat 1 yr (due 10/2024)  - Colonoscopy ordered in 10/2023, not scheduled yet because she cannot get a ride   - HbA1C prediabetic (5.9% in 2021)    PHQ2:   Little interest or pleasure in doing things? No  Feeling down, depressed, or hopeless? Yes (only regarding situation with daughter having cancer)    Current Outpatient Medications   Medication Instructions    amLODIPine (NORVASC) 10 mg, oral, Daily    arm brace (Wrist Brace) misc Right wrist brace    atorvastatin (LIPITOR) 20 mg, oral, Nightly    blood pressure monitor (Blood Pressure Kit) kit Check BP regularly at home    famotidine (Pepcid) 20 mg  "tablet 1 tablet, oral, 2 times daily    fexofenadine (ALLEGRA) 180 mg, oral, Daily    fluticasone (Flonase) 50 mcg/actuation nasal spray 2 sprays, nasal, Daily    fluticasone (Flonase) 50 mcg/actuation nasal spray 2 sprays, Each Nostril, Daily, Shake gently. Before first use, prime pump. After use, clean tip and replace cap.    hydrocortisone-pramoxine (Analpram-HC) 2.5-1 % cream Topical    levocetirizine (XYZAL) 5 mg, oral, 2 times daily PRN       Objective     Vitals: /81 (BP Location: Right arm, Patient Position: Sitting)   Pulse 71   Temp 36.1 °C (96.9 °F)   Ht 1.549 m (5' 1\") Comment: per pt  Wt 70.8 kg (156 lb)   SpO2 100%   BMI 29.48 kg/m²      Physical Exam  Constitutional:       General: She is not in acute distress.  HENT:      Head: Normocephalic.   Cardiovascular:      Rate and Rhythm: Normal rate and regular rhythm.      Pulses: Normal pulses.      Heart sounds: Normal heart sounds.   Pulmonary:      Effort: Pulmonary effort is normal. No respiratory distress.      Breath sounds: No wheezing, rhonchi or rales.   Musculoskeletal:         General: No tenderness. Normal range of motion.      Right lower leg: No edema.      Left lower leg: No edema.      Comments: Spine: no midline tenderness, no obvious step-offs or deformities   Skin:     General: Skin is warm and dry.      Capillary Refill: Capillary refill takes less than 2 seconds.      Comments: 1 cm thickened plaques on lateral aspect of 5th toe bilaterally,  R foot 1st digit thickened nail   Neurological:      General: No focal deficit present.      Mental Status: She is alert.      Comments: Sensation intact bilateral feet   Psychiatric:      Comments: Tearful, depressed mood when discussing daughter with cancer         Assessment/Plan     Jaky Kaufman is a 68 y.o. female with PMH of HTN, HLD, & chronic rhinitis who presents for HTN management follow-up. Patient reports taking amlodipine 10 mg as prescribed, however BP is above goal " in office today at 147/81. After last visit, pt was not able to obtain a BP cuff for home due to financial reasons, but now has the funds to pay the out of pocket costs. Discussed how to use BP cuff at home and provided a log for tracking. Discussed importance of healthy diet and other lifestyle modifications. Patient is experiencing significant emotional stress having recently found out that her daughter has stage 4 lung cancer. Elevated BP measurement today could be a reflection of this increased emotional stress. Patient has depressed mood when discussing her daughter and reports symptoms that are consistent with an Adjustment Disorder. She expressed interest in individual and group therapy. Referred to psychology and [facility] for support group services.     #HTN  - BP above target goal but asx  - Continue amlodipine 10 mg daily  - BP kit ordered, instructed how to measure at home and provided tracking log  - Discussed lifestyle modifications (DASH diet)    #Adjustment disorder  - Referred to behavioural therapist  - Provided contact information for [facility] ([phone])    #Sciatica, R leg  - no concerns for acute injury/infection   - Referred to PT    #[facility], bilateral 5th toe  - Referred to podiatry    #Health maintenance  - ordered HBA1C   - wt loss noted, Normal mammogram. Due for colonoscopy, already has referral. Messaged Kiana to help patient with transportation for colonoscopy.   - Return for Pap in 2 weeks    Problem List Items Addressed This Visit    None  Visit Diagnoses       Adjustment disorder with depressed mood    -  Primary    Relevant Orders    Referral to Psychology    Follow Up In Primary Care    Hypertension, unspecified type        Relevant Medications    blood pressure monitor (Blood Pressure Kit) kit    Other Relevant Orders    Follow Up In Primary Care    Chronic midline low back pain with right-sided sciatica        Relevant Orders    Referral to Physical  Therapy    Corn of toe        Relevant Orders    Referral to Podiatry    Prediabetes        Relevant Orders    Hemoglobin A1c          Attending Supervision: seen and discussed with attending physician Dr. Herring.    Nadege Pond, MS3    I saw and evaluated the patient with the medical student. I personally obtained the key and critical portions of the history and physical exam. I reviewed and modified the student's documentation and discussed patient with the medical student. I agree with the above documentation and medical decision making.    Dick Medellin MD  Family Medicine PGY2

## 2024-03-11 NOTE — PROGRESS NOTES
BCVA 20/25-2 OD/OS BAT 20/40, 20/40 Cataract present     New onset floater OD. Palma ring and PVD present OD.      Optical coherence tomography of the macula revealed:   OD: Normal foveal contour, photoreceptor, retinal pigment epithelium, IS/OS junction, central field 231 micron.  Vitreous hyaloid base not visualized due to PVD.  Findings are c/w PVD.  OS:  Normal foveal contour, photoreceptor, retinal pigment epithelium, IS/OS junction, central field 236 micron.  Vitreous hyaloid base not visualized due to quality.  Findings are normal.     Apparent VF loss and elevated IOP.   A Vee 24-2 threshold visual field test was done.  Results were:  OD: superior altitudinal defect with inferior temporal paracentral defect, pattern standard deviation 11.03 dB, mean deviation -15.64 dB, 16/17 fixation losses  OS: superior altitudinal defect with inferior nasal step, pattern standard deviation 7.52 dB, mean deviation -18.97 dB, 16/18 fixation losses  However, poor test reliability OD/OS     IOP elevated 23/22 pachy adjust -3 OU.      Optical coherence tomography of the retinal nerve fiber layer (RNFL) revealed:   OD: Normal thickness in SN sectors, borderline thinning IT with an average RNFL thickness of 74 micron.  OS: Normal thickness in all four sectors with an average RNFL thickness of 84 micron.      The patient was asked to return to our clinic or seek out eye care ASAP if new flashes of light or floaters are noted.       RTC 3 months for [facility] IOP and OCT RNFL.

## 2024-03-12 NOTE — PROGRESS NOTES
I saw and evaluated the patient. I personally obtained the key and critical portions of the history and physical exam or was physically present for key and critical portions performed by the resident/fellow. I reviewed the resident/fellow's documentation and discussed the patient with the resident/fellow. I agree with the resident/fellow's medical decision making as documented in the note.    Deborah Herring MD

## 2024-03-14 ENCOUNTER — TELEPHONE (OUTPATIENT)
Dept: PRIMARY CARE | Facility: CLINIC | Age: 69
End: 2024-03-14
Payer: COMMERCIAL

## 2024-03-14 NOTE — PROGRESS NOTES
Received message from MD regarding patient issue.      Called patient regarding transportation to and from appointment, left patient detailed voice message with name and number to return call x2.    [facility]  [id number]  [facility] [address]  [place] [id number]    Office Phone: [phone] Option 3  Patient Navigator: [phone]

## 2024-04-03 ENCOUNTER — APPOINTMENT (OUTPATIENT)
Dept: PRIMARY CARE | Facility: HOSPITAL | Age: 69
End: 2024-04-03
Payer: COMMERCIAL

## 2024-04-22 ENCOUNTER — TELEPHONE (OUTPATIENT)
Dept: PRIMARY CARE | Facility: CLINIC | Age: 69
End: 2024-04-22

## 2024-04-22 NOTE — TELEPHONE ENCOUNTER
Pt has an appt with you 5/16..She came in to let you know her urine is very dark in color ansd she is having a lot of pain. She wuld like a call [phone] thanks!!

## 2024-05-16 ENCOUNTER — OFFICE VISIT (OUTPATIENT)
Dept: PRIMARY CARE | Facility: CLINIC | Age: 69
End: 2024-05-16
Payer: COMMERCIAL

## 2024-05-16 VITALS
OXYGEN SATURATION: 98 % | SYSTOLIC BLOOD PRESSURE: 131 MMHG | WEIGHT: 150 LBS | HEIGHT: 62 IN | BODY MASS INDEX: 27.6 KG/M2 | DIASTOLIC BLOOD PRESSURE: 85 MMHG | HEART RATE: 76 BPM | TEMPERATURE: 96 F

## 2024-05-16 DIAGNOSIS — G56.01 CARPAL TUNNEL SYNDROME OF RIGHT WRIST: ICD-10-CM

## 2024-05-16 DIAGNOSIS — R35.0 URINARY FREQUENCY: ICD-10-CM

## 2024-05-16 DIAGNOSIS — M79.644 PAIN OF RIGHT MIDDLE FINGER: ICD-10-CM

## 2024-05-16 DIAGNOSIS — E78.5 HYPERLIPIDEMIA, UNSPECIFIED HYPERLIPIDEMIA TYPE: ICD-10-CM

## 2024-05-16 DIAGNOSIS — Z12.4 CERVICAL CANCER SCREENING: Primary | ICD-10-CM

## 2024-05-16 LAB
APPEARANCE UR: CLEAR
BACTERIA #/AREA URNS AUTO: ABNORMAL /HPF
BILIRUB UR STRIP.AUTO-MCNC: NEGATIVE MG/DL
COLOR UR: ABNORMAL
GLUCOSE UR STRIP.AUTO-MCNC: NORMAL MG/DL
KETONES UR STRIP.AUTO-MCNC: NEGATIVE MG/DL
LEUKOCYTE ESTERASE UR QL STRIP.AUTO: ABNORMAL
MUCOUS THREADS #/AREA URNS AUTO: ABNORMAL /LPF
NITRITE UR QL STRIP.AUTO: NEGATIVE
PH UR STRIP.AUTO: 6 [PH]
PROT UR STRIP.AUTO-MCNC: NEGATIVE MG/DL
RBC # UR STRIP.AUTO: NEGATIVE /UL
RBC #/AREA URNS AUTO: ABNORMAL /HPF
SP GR UR STRIP.AUTO: 1.02
SQUAMOUS #/AREA URNS AUTO: ABNORMAL /HPF
UROBILINOGEN UR STRIP.AUTO-MCNC: NORMAL MG/DL
WBC #/AREA URNS AUTO: ABNORMAL /HPF

## 2024-05-16 PROCEDURE — 1036F TOBACCO NON-USER: CPT

## 2024-05-16 PROCEDURE — 88175 CYTOPATH C/V AUTO FLUID REDO: CPT

## 2024-05-16 PROCEDURE — 99213 OFFICE O/P EST LOW 20 MIN: CPT

## 2024-05-16 PROCEDURE — 1126F AMNT PAIN NOTED NONE PRSNT: CPT

## 2024-05-16 PROCEDURE — 1160F RVW MEDS BY RX/DR IN RCRD: CPT

## 2024-05-16 PROCEDURE — 1159F MED LIST DOCD IN RCRD: CPT

## 2024-05-16 PROCEDURE — 81001 URINALYSIS AUTO W/SCOPE: CPT

## 2024-05-16 RX ORDER — ARM BRACE
EACH MISCELLANEOUS
Qty: 3.214 EACH | Refills: 0 | Status: SHIPPED | OUTPATIENT
Start: 2024-05-16

## 2024-05-16 RX ORDER — ATORVASTATIN CALCIUM 20 MG/1
20 TABLET, FILM COATED ORAL NIGHTLY
Qty: 90 TABLET | Refills: 3 | Status: SHIPPED | OUTPATIENT
Start: 2024-05-16 | End: 2025-05-11

## 2024-05-16 ASSESSMENT — PAIN SCALES - GENERAL: PAINLEVEL: 0-NO PAIN

## 2024-05-16 ASSESSMENT — ENCOUNTER SYMPTOMS
LOSS OF SENSATION IN FEET: 0
OCCASIONAL FEELINGS OF UNSTEADINESS: 0
DEPRESSION: 1

## 2024-05-16 NOTE — PROGRESS NOTES
"Subjective   Patient ID: Jaky Kaufman is a 68 y.o. female  w/Hx of chronic urticaria/angioedema, HTN, hyperlipidemia who presents for Follow-up (Concerned about urine color, pap, numbing in hands, pain in legs).    HPI     #Cervical screening   - Post-menopausal  - Last PAP: None in the file   - Abnormal PAP in the past: n/a  - Intermenstrual bleeding: No   - Pelvic pain: No  - Sexual hx: Not sexually active in the last 6 months, no STI screen   - h/o breast/uterine/ovarian ca: No   - Mammogram due in Nov 2024   - Reports of dark urine color and urinary frequency but no dysuria, abdo pain, fever     Review of Systems  12 system ROS completed, negative except as noted above.      Objective   /85 (BP Location: Left arm, Patient Position: Sitting, BP Cuff Size: Adult)   Pulse 76   Temp 35.6 °C (96 °F) (Temporal)   Ht 1.575 m (5' 2\")   Wt 68 kg (150 lb)   SpO2 98%   BMI 27.44 kg/m²     Physical Exam  Constitutional:       Appearance: Normal appearance.   Cardiovascular:      Rate and Rhythm: Normal rate and regular rhythm.      Pulses: Normal pulses.      Heart sounds: Normal heart sounds.   Pulmonary:      Effort: Pulmonary effort is normal.      Breath sounds: Normal breath sounds.   Abdominal:      General: Abdomen is flat. Bowel sounds are normal.      Palpations: Abdomen is soft.   Genitourinary:     Comments: Speculum exam: normal external female genitalia with no lesions or rashes. speculum exam done with visualization of cervix. mucosa is pink, moist, no discharge, bleeding, lesions.  Skin:     General: Skin is warm.      Capillary Refill: Capillary refill takes less than 2 seconds.   Neurological:      Mental Status: She is alert and oriented to person, place, and time.       Assessment/Plan   Problem List Items Addressed This Visit    None  Visit Diagnoses         Codes    Cervical cancer screening    -  Primary Z12.4    Relevant Orders    THINPREP PAP TEST    Urinary frequency     R35.0    " Relevant Orders    Urinalysis with Reflex Microscopic    Pain of right middle finger     M79.644    Relevant Medications    arm brace (Wrist Brace) misc    Carpal tunnel syndrome of right wrist     G56.01          Jaky Kaufman is a 68 y.o. female  w/Hx of chronic urticaria/angioedema, HTN, hyperlipidemia who presents for PAP. Patient reports of urinary frequency but clinically stable. UA ordered to r/o UTI. Also reports of persistent numbness of the right finger, likely 2/2 carpal tunnel (patient has not tried wrist splint yet). Re-ordered rx to DME.     RTC in 3 months for follow up (BP/right finger numbness)     Discussed with Dr Emeka Medellin MD  Family Medicine PGY2

## 2024-05-17 NOTE — PROGRESS NOTES
I reviewed the resident/fellow's documentation and discussed the patient with the resident/fellow. I agree with the resident/fellow's medical decision making as documented in the note.    Linda Hendrickson MD

## 2024-05-30 LAB
CYTOLOGY CMNT CVX/VAG CYTO-IMP: NORMAL
LAB AP HPV GENOTYPE QUESTION: YES
LAB AP HPV HR: NORMAL
LABORATORY COMMENT REPORT: NORMAL
PATH REPORT.TOTAL CANCER: NORMAL

## 2024-06-03 ENCOUNTER — OFFICE VISIT (OUTPATIENT)
Dept: OPHTHALMOLOGY | Facility: CLINIC | Age: 69
End: 2024-06-03
Payer: COMMERCIAL

## 2024-06-03 DIAGNOSIS — H43.811 POSTERIOR VITREOUS DETACHMENT OF RIGHT EYE: ICD-10-CM

## 2024-06-03 DIAGNOSIS — H52.203 MYOPIA OF BOTH EYES WITH ASTIGMATISM AND PRESBYOPIA: ICD-10-CM

## 2024-06-03 DIAGNOSIS — H25.813 COMBINED FORM OF AGE-RELATED CATARACT, BOTH EYES: ICD-10-CM

## 2024-06-03 DIAGNOSIS — H40.053 ELEVATED IOP, BILATERAL: Primary | ICD-10-CM

## 2024-06-03 DIAGNOSIS — H52.13 MYOPIA OF BOTH EYES WITH ASTIGMATISM AND PRESBYOPIA: ICD-10-CM

## 2024-06-03 DIAGNOSIS — H52.4 MYOPIA OF BOTH EYES WITH ASTIGMATISM AND PRESBYOPIA: ICD-10-CM

## 2024-06-03 PROCEDURE — 92012 INTRM OPH EXAM EST PATIENT: CPT | Performed by: OPTOMETRIST

## 2024-06-03 PROCEDURE — 92133 CPTRZD OPH DX IMG PST SGM ON: CPT | Performed by: OPTOMETRIST

## 2024-06-03 ASSESSMENT — VISUAL ACUITY
OS_BAT_LOW: 20/40
METHOD: SNELLEN - LINEAR
OD_CC: 20/25
CORRECTION_TYPE: GLASSES
OS_CC+: -2
OD_BAT_HIGH: 20/50
OS_BAT_MED: 20/50
OD_BAT_LOW: 20/40
OD_CC+: -2
OS_CC: 20/40
OD_BAT_MED: 20/50
OS_BAT_HIGH: 20/50
OS_PH_CC: 20/30

## 2024-06-03 ASSESSMENT — REFRACTION_WEARINGRX
OS_SPHERE: -1.50
OS_AXIS: 090
OS_CYLINDER: -0.75
OD_AXIS: 085
OD_SPHERE: -1.25
OS_ADD: +2.50
OD_CYLINDER: -0.50
OD_ADD: +2.50

## 2024-06-03 ASSESSMENT — CONF VISUAL FIELD
OS_INFERIOR_TEMPORAL_RESTRICTION: 1
OD_INFERIOR_NASAL_RESTRICTION: 1
OS_INFERIOR_NASAL_RESTRICTION: 1

## 2024-06-03 ASSESSMENT — TONOMETRY
IOP_METHOD: GOLDMANN APPLANATION
OS_IOP_MMHG: 21
OD_IOP_MMHG: 21

## 2024-06-03 ASSESSMENT — ENCOUNTER SYMPTOMS
GASTROINTESTINAL NEGATIVE: 0
MUSCULOSKELETAL NEGATIVE: 0
ENDOCRINE NEGATIVE: 0
HEMATOLOGIC/LYMPHATIC NEGATIVE: 0
CARDIOVASCULAR NEGATIVE: 0
NEUROLOGICAL NEGATIVE: 0
PSYCHIATRIC NEGATIVE: 0
CONSTITUTIONAL NEGATIVE: 0
RESPIRATORY NEGATIVE: 0
ALLERGIC/IMMUNOLOGIC NEGATIVE: 0
EYES NEGATIVE: 0

## 2024-06-03 ASSESSMENT — CUP TO DISC RATIO
OS_RATIO: 0.3
OD_RATIO: 0.3

## 2024-06-03 ASSESSMENT — SLIT LAMP EXAM - LIDS
COMMENTS: NORMAL
COMMENTS: NORMAL

## 2024-06-03 ASSESSMENT — EXTERNAL EXAM - RIGHT EYE: OD_EXAM: NORMAL

## 2024-06-03 ASSESSMENT — PACHYMETRY
OD_CT(UM): 581
OS_CT(UM): 584

## 2024-06-03 ASSESSMENT — EXTERNAL EXAM - LEFT EYE: OS_EXAM: NORMAL

## 2024-06-03 NOTE — PROGRESS NOTES
[facility] 20/25 OD 20/30 OS was 20/25-2 OD/OS BAT 20/50 OD/OS was 20/40, 20/40 Cataract present.  Cataract maturing and recommend cataract pre-op testing.      New onset floater OD. Palma ring and PVD present OD.      Optical coherence tomography of the macula revealed:   OD: Normal foveal contour, photoreceptor, retinal pigment epithelium, IS/OS junction, central field 231 micron.  Vitreous hyaloid base not visualized due to PVD.  Findings are c/w PVD.  OS:  Normal foveal contour, photoreceptor, retinal pigment epithelium, IS/OS junction, central field 236 micron.  Vitreous hyaloid base not visualized due to quality.  Findings are normal.     Apparent VF loss and elevated IOP.   A Vee 24-2 threshold visual field test was done.  Results were:  OD: superior altitudinal defect with inferior temporal paracentral defect, pattern standard deviation 11.03 dB, mean deviation -15.64 dB, 16/17 fixation losses  OS: superior altitudinal defect with inferior nasal step, pattern standard deviation 7.52 dB, mean deviation -18.97 dB, 16/18 fixation losses  However, poor test reliability OD/OS     IOP elevated 21/21 today and Tmax 23/22 pachy adjust -3 OU.      Optical coherence tomography of the retinal nerve fiber layer (RNFL) revealed:   OD: Normal thickness in SN sectors, borderline thinning IT with an average RNFL thickness of 83 was 74 micron. Improved due to last measurement on cirrus and new measurement on [place].   OS: Normal thickness in all four sectors with an average RNFL thickness of 91 was 84 micron. Improved due to last measurement on cirrus and new measurement on [place].      The patient was asked to return to our clinic or seek out eye care ASAP if new flashes of light or floaters are noted.       Referred .

## 2024-07-24 ENCOUNTER — APPOINTMENT (OUTPATIENT)
Dept: OPHTHALMOLOGY | Facility: CLINIC | Age: 69
End: 2024-07-24
Payer: COMMERCIAL

## 2024-08-06 ENCOUNTER — APPOINTMENT (OUTPATIENT)
Dept: OPHTHALMOLOGY | Facility: CLINIC | Age: 69
End: 2024-08-06
Payer: COMMERCIAL

## 2024-08-06 DIAGNOSIS — H25.812 COMBINED FORM OF AGE-RELATED CATARACT, LEFT EYE: ICD-10-CM

## 2024-08-06 DIAGNOSIS — H25.813 COMBINED FORM OF AGE-RELATED CATARACT, BOTH EYES: Primary | ICD-10-CM

## 2024-08-06 DIAGNOSIS — H25.811 COMBINED FORM OF AGE-RELATED CATARACT, RIGHT EYE: ICD-10-CM

## 2024-08-06 PROCEDURE — 92136 OPHTHALMIC BIOMETRY: CPT | Mod: BILATERAL PROCEDURE | Performed by: OPHTHALMOLOGY

## 2024-08-06 PROCEDURE — 99213 OFFICE O/P EST LOW 20 MIN: CPT | Performed by: OPHTHALMOLOGY

## 2024-08-06 PROCEDURE — 92136 OPHTHALMIC BIOMETRY: CPT | Performed by: OPHTHALMOLOGY

## 2024-08-06 PROCEDURE — 92134 CPTRZ OPH DX IMG PST SGM RTA: CPT | Mod: BILATERAL PROCEDURE | Performed by: OPHTHALMOLOGY

## 2024-08-06 RX ORDER — PHENYLEPHRINE HYDROCHLORIDE 25 MG/ML
1 SOLUTION/ DROPS OPHTHALMIC
OUTPATIENT
Start: 2024-08-06 | End: 2024-08-06

## 2024-08-06 RX ORDER — TETRACAINE HYDROCHLORIDE 5 MG/ML
1 SOLUTION OPHTHALMIC ONCE
OUTPATIENT
Start: 2024-08-06 | End: 2024-08-06

## 2024-08-06 RX ORDER — TROPICAMIDE 10 MG/ML
1 SOLUTION/ DROPS OPHTHALMIC
OUTPATIENT
Start: 2024-08-06 | End: 2024-08-06

## 2024-08-06 RX ORDER — OFLOXACIN 3 MG/ML
1 SOLUTION/ DROPS OPHTHALMIC 4 TIMES DAILY
Qty: 5 ML | Refills: 1 | Status: SHIPPED | OUTPATIENT
Start: 2024-08-06 | End: 2024-08-14

## 2024-08-06 RX ORDER — KETOROLAC TROMETHAMINE 5 MG/ML
1 SOLUTION OPHTHALMIC 4 TIMES DAILY
Qty: 5 ML | Refills: 1 | Status: SHIPPED | OUTPATIENT
Start: 2024-08-06 | End: 2024-08-21

## 2024-08-06 RX ORDER — PREDNISOLONE ACETATE 10 MG/ML
1 SUSPENSION/ DROPS OPHTHALMIC 4 TIMES DAILY
Qty: 5 ML | Refills: 1 | Status: SHIPPED | OUTPATIENT
Start: 2024-08-06 | End: 2024-08-14

## 2024-08-06 ASSESSMENT — CONF VISUAL FIELD
OS_INFERIOR_TEMPORAL_RESTRICTION: 0
OS_SUPERIOR_TEMPORAL_RESTRICTION: 0
OS_SUPERIOR_NASAL_RESTRICTION: 0
OS_NORMAL: 1
OD_NORMAL: 1
OD_SUPERIOR_TEMPORAL_RESTRICTION: 0
OD_INFERIOR_TEMPORAL_RESTRICTION: 0
OD_SUPERIOR_NASAL_RESTRICTION: 0
OS_INFERIOR_NASAL_RESTRICTION: 0
OD_INFERIOR_NASAL_RESTRICTION: 0

## 2024-08-06 ASSESSMENT — TONOMETRY
IOP_METHOD: GOLDMANN APPLANATION
OD_IOP_MMHG: 14
OS_IOP_MMHG: 15

## 2024-08-06 ASSESSMENT — EXTERNAL EXAM - LEFT EYE: OS_EXAM: NORMAL

## 2024-08-06 ASSESSMENT — VISUAL ACUITY
CORRECTION_TYPE: GLASSES
OS_CC+: -1
METHOD: SNELLEN - LINEAR
OS_CC: 20/30
OD_CC: 20/30
OS_BAT_MED: 20/50
OD_BAT_MED: 20/50-2
OD_CC+: -2

## 2024-08-06 ASSESSMENT — ENCOUNTER SYMPTOMS
HEMATOLOGIC/LYMPHATIC NEGATIVE: 0
CARDIOVASCULAR NEGATIVE: 0
ENDOCRINE NEGATIVE: 0
RESPIRATORY NEGATIVE: 0
MUSCULOSKELETAL NEGATIVE: 0
ALLERGIC/IMMUNOLOGIC NEGATIVE: 0
CONSTITUTIONAL NEGATIVE: 0
GASTROINTESTINAL NEGATIVE: 0
PSYCHIATRIC NEGATIVE: 0
NEUROLOGICAL NEGATIVE: 0
EYES NEGATIVE: 0

## 2024-08-06 ASSESSMENT — REFRACTION_WEARINGRX
OS_CYLINDER: -0.75
OD_SPHERE: -1.25
OD_AXIS: 085
OS_AXIS: 090
OD_ADD: +2.50
OD_CYLINDER: -0.50
OS_SPHERE: -1.50
OS_ADD: +2.50

## 2024-08-06 ASSESSMENT — REFRACTION_MANIFEST
OS_AXIS: 090
OD_CYLINDER: -0.50
METHOD_AUTOREFRACTION: 1
OS_CYLINDER: -1.00
OD_AXIS: 085
OS_SPHERE: -1.50
OD_SPHERE: -1.25

## 2024-08-06 ASSESSMENT — PACHYMETRY
OD_CT(UM): 581
OS_CT(UM): 584

## 2024-08-06 ASSESSMENT — CUP TO DISC RATIO
OS_RATIO: 0.3
OD_RATIO: 0.3

## 2024-08-06 ASSESSMENT — EXTERNAL EXAM - RIGHT EYE: OD_EXAM: NORMAL

## 2024-08-06 ASSESSMENT — SLIT LAMP EXAM - LIDS
COMMENTS: NORMAL
COMMENTS: NORMAL

## 2024-08-06 NOTE — PROGRESS NOTES
Assessment/Plan   Diagnoses and all orders for this visit:  Combined form of age-related cataract, both eyes  -     OCT, Retina - OU - Both Eyes  -     IOL Biometry - OU - Both Eyes  -     ofloxacin (Ocuflox) 0.3 % ophthalmic solution; Administer 1 drop into affected eye(s) 4 times a day for 8 days. Begin using 1 day  before surgery  -     ketorolac (Acular) 0.5 % ophthalmic solution; Administer 1 drop into affected eye(s) 4 times a day for 15 days. Start 1 day before surgery and used 4 times for 2 weeks  -     prednisoLONE acetate (Pred-Forte) 1 % ophthalmic suspension; Administer 1 drop into affected eye(s) 4 times a day for 30 doses. Begin using after surgery  Combined form of age-related cataract, right eye  -     Case Request Operating Room: Extraction Cataract with Insertion Intraocular Lens; Standing  Combined form of age-related cataract, left eye  -     Case Request Operating Room: Extraction Cataract with Insertion Intraocular Lens; Standing  Visually significant cataract which eye?:[id number]}.   Indication/anticipated outcome for cataract surgery: To potentially improve visual acuity and improve quality of life/reduce symptoms. To obtain a better view of the retina/optic nerve. To reduce anisometropia).  Based on a comprehensive eye exam performed August 6, 2024, a visually significant cataract appears to be the source of decreased vision, diminished quality of life, and impairment of activities of daily living. Discussed option of cataract surgery vs observation. Discussed surgical procedure with patient. Discussed potential risks, benefits, and complications of cataract surgery including but not limited to pain, bleeding, infection, inflammation, edema, increased eye pressure, retinal tear/detachment, lens dislocation, ptosis, iris damage, need for additional surgery, need for glasses after surgery, loss of vision/loss of eye. Patient understands and wishes to proceed. All questions were answered.   Will  schedule cataract surgery right eye.first  Schedule  other eye following cataract surgery left eye.     Discussed IOL options (standard monofocal, monofocal with monovision, toric, multifocal). Lens chosen: standard.   Aim: 0. Right eye (OD)  Aim-1.25 left eye  Dominance: right eye    Had thorough discussion with patient re: aim. Discussed that may potentially need glasses for best vision both at distance and at near.     Special considerations: tb  Best contact number: [phone]  Drops:   -Ketorolac and Ofloxacin 4x/day starting 1 day prior to surgery; Prednisolone acetate 1% 4x/day starting after surgery

## 2024-08-12 ENCOUNTER — TELEPHONE (OUTPATIENT)
Dept: PRIMARY CARE | Facility: CLINIC | Age: 69
End: 2024-08-12

## 2024-08-12 NOTE — TELEPHONE ENCOUNTER
Called pt to reschedule 09/24 appt with Dr. Medellin, could not leave a vm.    Shanice NOLEN MA 08/12/24

## 2024-08-14 DIAGNOSIS — I10 HYPERTENSION, UNSPECIFIED TYPE: ICD-10-CM

## 2024-08-14 RX ORDER — AMLODIPINE BESYLATE 10 MG/1
10 TABLET ORAL DAILY
Qty: 90 TABLET | Refills: 3 | Status: SHIPPED | OUTPATIENT
Start: 2024-08-14 | End: 2025-08-14

## 2024-09-24 ENCOUNTER — APPOINTMENT (OUTPATIENT)
Dept: PRIMARY CARE | Facility: CLINIC | Age: 69
End: 2024-09-24
Payer: COMMERCIAL

## 2024-09-27 ENCOUNTER — APPOINTMENT (OUTPATIENT)
Dept: PRIMARY CARE | Facility: CLINIC | Age: 69
End: 2024-09-27
Payer: COMMERCIAL

## 2025-03-17 ENCOUNTER — OFFICE VISIT (OUTPATIENT)
Dept: URGENT CARE | Age: 70
End: 2025-03-17
Payer: MEDICARE

## 2025-03-17 VITALS
DIASTOLIC BLOOD PRESSURE: 78 MMHG | WEIGHT: 131.4 LBS | HEART RATE: 74 BPM | RESPIRATION RATE: 18 BRPM | TEMPERATURE: 98.5 F | HEIGHT: 61 IN | SYSTOLIC BLOOD PRESSURE: 138 MMHG | BODY MASS INDEX: 24.81 KG/M2 | OXYGEN SATURATION: 95 %

## 2025-03-17 DIAGNOSIS — R21 RASH AND NONSPECIFIC SKIN ERUPTION: ICD-10-CM

## 2025-03-17 DIAGNOSIS — H60.399: Primary | ICD-10-CM

## 2025-03-17 DIAGNOSIS — S00.419A: Primary | ICD-10-CM

## 2025-03-17 RX ORDER — MUPIROCIN 20 MG/G
OINTMENT TOPICAL
Qty: 22 G | Refills: 0 | Status: SHIPPED | OUTPATIENT
Start: 2025-03-17 | End: 2025-03-27

## 2025-03-17 RX ORDER — CETIRIZINE HYDROCHLORIDE 10 MG/1
10 TABLET ORAL DAILY
Qty: 30 TABLET | Refills: 0 | Status: SHIPPED | OUTPATIENT
Start: 2025-03-17 | End: 2025-04-16

## 2025-03-17 RX ORDER — METHYLPREDNISOLONE 4 MG/1
TABLET ORAL
Qty: 21 TABLET | Refills: 0 | Status: SHIPPED | OUTPATIENT
Start: 2025-03-17 | End: 2025-03-23

## 2025-03-17 NOTE — PATIENT INSTRUCTIONS
Thank you for letting me care for you today.    You have been seen today for a rash and abrasions of both ear canals.    Start taking the Zyrtec daily for the rash.  Start the steroid pack for the rash also.    Put the cream on both ears as directed.    I have placed a referral for you for primary care and an allergist    Please seek care in emergency room for red flags as discussed during visit.

## 2025-03-17 NOTE — PROGRESS NOTES
Subjective   Patient ID: Jaky Kaufman is a 69 y.o. female. They present today with a chief complaint of Earache (Scab, bleeding on right ear. Unsure if something is inside. ).    History of Present Illness  This is a 69-year-old female who presents to the clinic today for evaluation of scabs that she picks at on the inner tragus of bilateral ears.  Patient is concerned they are getting infected.  Patient also reports she has a rash on her back that is itchy.  Denies new soaps, lotions, medications, pets, foods.States she has often had unknown rashes.  Has not taking anything OTC.  Denies numbness and tingling.  Denies fever, chills.  Denies recent illness      Earache   Associated symptoms include a rash. Pertinent negatives include no headaches.       Past Medical History  Allergies as of 03/17/2025 - Reviewed 03/17/2025   Allergen Reaction Noted    Cat dander Swelling 10/12/2023    House dust Cough 10/12/2023       (Not in a hospital admission)       History reviewed. No pertinent past medical history.    History reviewed. No pertinent surgical history.     reports that she has quit smoking. Her smoking use included cigarettes. She has never been exposed to tobacco smoke. She has never used smokeless tobacco. She reports current alcohol use. She reports current drug use. Drug: Marijuana.    Review of Systems  Review of Systems   Constitutional:  Negative for appetite change, chills, fatigue and fever.   HENT:  Positive for ear pain.    Cardiovascular:  Negative for chest pain, palpitations and leg swelling.   Musculoskeletal:  Negative for myalgias.   Skin:  Positive for rash.   Neurological:  Negative for headaches.   All other systems reviewed and are negative.                                 Objective    Vitals:    03/17/25 1245   BP: 138/78   BP Location: Left arm   Patient Position: Sitting   BP Cuff Size: Adult   Pulse: 74   Resp: 18   Temp: 36.9 °C (98.5 °F)   TempSrc: Oral   SpO2: 95%   Weight: 59.6 kg  "(131 lb 6.4 oz)   Height: 1.549 m (5' 1\")     No LMP recorded. Patient is postmenopausal.    Physical Exam  Vitals reviewed.   Constitutional:       Appearance: Normal appearance. She is normal weight.   HENT:      Head: Normocephalic and atraumatic.      Right Ear: Tympanic membrane and ear canal normal.      Left Ear: Tympanic membrane and ear canal normal.      Nose: Nose normal.      Mouth/Throat:      Mouth: Mucous membranes are moist.      Pharynx: Oropharynx is clear.   Eyes:      Extraocular Movements: Extraocular movements intact.      Conjunctiva/sclera: Conjunctivae normal.      Pupils: Pupils are equal, round, and reactive to light.   Cardiovascular:      Rate and Rhythm: Normal rate and regular rhythm.      Pulses: Normal pulses.      Heart sounds: Normal heart sounds.   Pulmonary:      Effort: Pulmonary effort is normal.      Breath sounds: Normal breath sounds.   Abdominal:      General: Abdomen is flat. Bowel sounds are normal.      Palpations: Abdomen is soft.   Musculoskeletal:         General: Normal range of motion.      Cervical back: Normal range of motion and neck supple.   Skin:     General: Skin is warm and dry.      Capillary Refill: Capillary refill takes less than 2 seconds.      Findings: Rash present. Rash is scaling.   Neurological:      General: No focal deficit present.      Mental Status: She is alert and oriented to person, place, and time.         Procedures    Point of Care Test & Imaging Results from this visit  No results found for this visit on 03/17/25.   No results found.    Diagnostic study results (if any) were reviewed by Kristin L Schoenlein, APRN-CNP.    Assessment/Plan   Allergies, medications, history, and pertinent labs/EKGs/Imaging reviewed by Kristin L Schoenlein, APRN-CNP.     Medical Decision Making  VSS, NAD, Nontoxic appearing.  Behind each tragus is not erythematous open area that had been scabbed over.  There is no oozing.  There is no red streaking.  Patient " has scaly papular lesions noted on lower back.  They cross midline.  There is no bull's-eye appearance.  There is no vesicles.  There is no signs and symptoms of infection.  Additional physical exam as document above    Symptoms, history, and exam are consistent with: Abrasion and rash.  For abrasions, mupirocin ointment was prescribed.  For patient's rash I placed her on Zyrtec and Medrol pack.  A referral to allergy was placed on her behalf.    Differential Dx include, however, are not limited to: Cellulitis, contact dermatitis, chronic idiopathic urticaria , tinea    I have a low suspicion for any acute pathologies requiring emergent evaluation and further workup at this time.  I believe patient is safe to discharge home with a low threshold for emergency room as discussed during visit.  We discussed close follow-up with primary care provider/pediatrician. Supportive care discussed.  Medication(s) profile of OTC and Rxed medication(s) if prescribed was (were) reviewed.  All questions answered and addressed.  Patient verbalized understanding.      Orders and Diagnoses  Diagnoses and all orders for this visit:  Abrasion of ear canal with infection, initial encounter  -     Referral to Allergy; Future  -     mupirocin (Bactroban) 2 % ointment; Apply topically 3 times a day for 10 days. To both ears  Rash and nonspecific skin eruption  -     Referral to Primary Care; Future  -     Referral to Allergy; Future  -     methylPREDNISolone (Medrol Dospak) 4 mg tablets; Follow schedule on package instructions  -     cetirizine (ZyrTEC) 10 mg tablet; Take 1 tablet (10 mg) by mouth once daily.      Medical Admin Record      Patient disposition: Home    Electronically signed by Kristin L Schoenlein, APRN-AGUSTIN  1:55 PM

## 2025-03-19 ASSESSMENT — ENCOUNTER SYMPTOMS
FATIGUE: 0
MYALGIAS: 0
APPETITE CHANGE: 0
FEVER: 0
PALPITATIONS: 0
HEADACHES: 0
CHILLS: 0

## 2025-04-08 ENCOUNTER — ANESTHESIA EVENT (OUTPATIENT)
Dept: OPERATING ROOM | Facility: CLINIC | Age: 70
End: 2025-04-08
Payer: COMMERCIAL

## 2025-04-09 ENCOUNTER — HOSPITAL ENCOUNTER (OUTPATIENT)
Facility: CLINIC | Age: 70
Setting detail: OUTPATIENT SURGERY
Discharge: HOME | End: 2025-04-09
Attending: OPHTHALMOLOGY | Admitting: OPHTHALMOLOGY
Payer: COMMERCIAL

## 2025-04-09 ENCOUNTER — ANESTHESIA (OUTPATIENT)
Dept: OPERATING ROOM | Facility: CLINIC | Age: 70
End: 2025-04-09
Payer: COMMERCIAL

## 2025-04-09 ENCOUNTER — PREP FOR PROCEDURE (OUTPATIENT)
Dept: OPHTHALMOLOGY | Facility: CLINIC | Age: 70
End: 2025-04-09
Payer: COMMERCIAL

## 2025-04-09 VITALS
DIASTOLIC BLOOD PRESSURE: 76 MMHG | TEMPERATURE: 99.9 F | SYSTOLIC BLOOD PRESSURE: 137 MMHG | OXYGEN SATURATION: 98 % | BODY MASS INDEX: 24.72 KG/M2 | RESPIRATION RATE: 16 BRPM | HEIGHT: 61 IN | WEIGHT: 130.95 LBS | HEART RATE: 88 BPM

## 2025-04-09 DIAGNOSIS — H25.811 COMBINED FORM OF AGE-RELATED CATARACT, RIGHT EYE: Primary | ICD-10-CM

## 2025-04-09 PROCEDURE — 2500000004 HC RX 250 GENERAL PHARMACY W/ HCPCS (ALT 636 FOR OP/ED): Performed by: NURSE ANESTHETIST, CERTIFIED REGISTERED

## 2025-04-09 PROCEDURE — 3600000003 HC OR TIME - INITIAL BASE CHARGE - PROCEDURE LEVEL THREE: Performed by: OPHTHALMOLOGY

## 2025-04-09 PROCEDURE — 2500000005 HC RX 250 GENERAL PHARMACY W/O HCPCS: Performed by: OPHTHALMOLOGY

## 2025-04-09 PROCEDURE — 3600000008 HC OR TIME - EACH INCREMENTAL 1 MINUTE - PROCEDURE LEVEL THREE: Performed by: OPHTHALMOLOGY

## 2025-04-09 PROCEDURE — 3700000002 HC GENERAL ANESTHESIA TIME - EACH INCREMENTAL 1 MINUTE: Performed by: OPHTHALMOLOGY

## 2025-04-09 PROCEDURE — A66984 PR REMV CATARACT EXTRACAP,INSERT LENS: Performed by: NURSE ANESTHETIST, CERTIFIED REGISTERED

## 2025-04-09 PROCEDURE — 7100000010 HC PHASE TWO TIME - EACH INCREMENTAL 1 MINUTE: Performed by: OPHTHALMOLOGY

## 2025-04-09 PROCEDURE — 7100000009 HC PHASE TWO TIME - INITIAL BASE CHARGE: Performed by: OPHTHALMOLOGY

## 2025-04-09 PROCEDURE — 2500000004 HC RX 250 GENERAL PHARMACY W/ HCPCS (ALT 636 FOR OP/ED): Performed by: OPHTHALMOLOGY

## 2025-04-09 PROCEDURE — 66984 XCAPSL CTRC RMVL W/O ECP: CPT | Performed by: OPHTHALMOLOGY

## 2025-04-09 PROCEDURE — 2720000007 HC OR 272 NO HCPCS: Performed by: OPHTHALMOLOGY

## 2025-04-09 PROCEDURE — 3700000001 HC GENERAL ANESTHESIA TIME - INITIAL BASE CHARGE: Performed by: OPHTHALMOLOGY

## 2025-04-09 PROCEDURE — V2632 POST CHMBR INTRAOCULAR LENS: HCPCS | Performed by: OPHTHALMOLOGY

## 2025-04-09 PROCEDURE — A66984 PR REMV CATARACT EXTRACAP,INSERT LENS: Performed by: ANESTHESIOLOGY

## 2025-04-09 PROCEDURE — 2760000001 HC OR 276 NO HCPCS: Performed by: OPHTHALMOLOGY

## 2025-04-09 RX ORDER — TROPICAMIDE 10 MG/ML
1 SOLUTION/ DROPS OPHTHALMIC
Status: COMPLETED | OUTPATIENT
Start: 2025-04-09 | End: 2025-04-09

## 2025-04-09 RX ORDER — TETRACAINE HYDROCHLORIDE 5 MG/ML
SOLUTION OPHTHALMIC AS NEEDED
Status: DISCONTINUED | OUTPATIENT
Start: 2025-04-09 | End: 2025-04-09 | Stop reason: HOSPADM

## 2025-04-09 RX ORDER — SODIUM CHLORIDE 0.9 % (FLUSH) 0.9 %
SYRINGE (ML) INJECTION CONTINUOUS PRN
Status: DISCONTINUED | OUTPATIENT
Start: 2025-04-09 | End: 2025-04-09

## 2025-04-09 RX ORDER — TETRACAINE HYDROCHLORIDE 5 MG/ML
1 SOLUTION OPHTHALMIC ONCE
Status: COMPLETED | OUTPATIENT
Start: 2025-04-09 | End: 2025-04-09

## 2025-04-09 RX ORDER — MANNITOL 20 G/100ML
12.5 INJECTION, SOLUTION INTRAVENOUS ONCE
Status: COMPLETED | OUTPATIENT
Start: 2025-04-09 | End: 2025-04-09

## 2025-04-09 RX ORDER — PHENYLEPHRINE HYDROCHLORIDE 25 MG/ML
1 SOLUTION/ DROPS OPHTHALMIC
Status: COMPLETED | OUTPATIENT
Start: 2025-04-09 | End: 2025-04-09

## 2025-04-09 RX ORDER — HYDRALAZINE HYDROCHLORIDE 20 MG/ML
5 INJECTION INTRAMUSCULAR; INTRAVENOUS EVERY 30 MIN PRN
Status: DISCONTINUED | OUTPATIENT
Start: 2025-04-09 | End: 2025-04-09 | Stop reason: HOSPADM

## 2025-04-09 RX ORDER — POVIDONE-IODINE 5 %
SOLUTION, NON-ORAL OPHTHALMIC (EYE) AS NEEDED
Status: DISCONTINUED | OUTPATIENT
Start: 2025-04-09 | End: 2025-04-09 | Stop reason: HOSPADM

## 2025-04-09 RX ORDER — MIDAZOLAM HYDROCHLORIDE 1 MG/ML
INJECTION, SOLUTION INTRAMUSCULAR; INTRAVENOUS AS NEEDED
Status: DISCONTINUED | OUTPATIENT
Start: 2025-04-09 | End: 2025-04-09

## 2025-04-09 RX ORDER — FENTANYL CITRATE 50 UG/ML
INJECTION, SOLUTION INTRAMUSCULAR; INTRAVENOUS AS NEEDED
Status: DISCONTINUED | OUTPATIENT
Start: 2025-04-09 | End: 2025-04-09

## 2025-04-09 RX ORDER — LIDOCAINE HYDROCHLORIDE 10 MG/ML
INJECTION, SOLUTION EPIDURAL; INFILTRATION; INTRACAUDAL; PERINEURAL AS NEEDED
Status: DISCONTINUED | OUTPATIENT
Start: 2025-04-09 | End: 2025-04-09 | Stop reason: HOSPADM

## 2025-04-09 RX ORDER — ACETAMINOPHEN 325 MG/1
650 TABLET ORAL ONCE
Status: DISCONTINUED | OUTPATIENT
Start: 2025-04-09 | End: 2025-04-09 | Stop reason: HOSPADM

## 2025-04-09 RX ADMIN — TROPICAMIDE 1 DROP: 10 SOLUTION/ DROPS OPHTHALMIC at 12:20

## 2025-04-09 RX ADMIN — PHENYLEPHRINE HYDROCHLORIDE 1 DROP: 25 SOLUTION/ DROPS OPHTHALMIC at 12:25

## 2025-04-09 RX ADMIN — TROPICAMIDE 1 DROP: 10 SOLUTION/ DROPS OPHTHALMIC at 12:25

## 2025-04-09 RX ADMIN — PHENYLEPHRINE HYDROCHLORIDE 1 DROP: 25 SOLUTION/ DROPS OPHTHALMIC at 12:20

## 2025-04-09 RX ADMIN — TETRACAINE HYDROCHLORIDE 1 DROP: 5 SOLUTION OPHTHALMIC at 12:15

## 2025-04-09 RX ADMIN — Medication: at 13:21

## 2025-04-09 RX ADMIN — TROPICAMIDE 1 DROP: 10 SOLUTION/ DROPS OPHTHALMIC at 12:15

## 2025-04-09 RX ADMIN — FENTANYL CITRATE 50 MCG: 50 INJECTION, SOLUTION INTRAMUSCULAR; INTRAVENOUS at 13:21

## 2025-04-09 RX ADMIN — PHENYLEPHRINE HYDROCHLORIDE 1 DROP: 25 SOLUTION/ DROPS OPHTHALMIC at 12:15

## 2025-04-09 RX ADMIN — MIDAZOLAM 1 MG: 1 INJECTION INTRAMUSCULAR; INTRAVENOUS at 13:21

## 2025-04-09 RX ADMIN — MANNITOL 12.5 G: 20 INJECTION, SOLUTION INTRAVENOUS at 12:15

## 2025-04-09 SDOH — HEALTH STABILITY: MENTAL HEALTH: CURRENT SMOKER: 1

## 2025-04-09 ASSESSMENT — COLUMBIA-SUICIDE SEVERITY RATING SCALE - C-SSRS
6. HAVE YOU EVER DONE ANYTHING, STARTED TO DO ANYTHING, OR PREPARED TO DO ANYTHING TO END YOUR LIFE?: NO
2. HAVE YOU ACTUALLY HAD ANY THOUGHTS OF KILLING YOURSELF?: NO
1. IN THE PAST MONTH, HAVE YOU WISHED YOU WERE DEAD OR WISHED YOU COULD GO TO SLEEP AND NOT WAKE UP?: NO

## 2025-04-09 ASSESSMENT — ENCOUNTER SYMPTOMS
ALLERGIC/IMMUNOLOGIC NEGATIVE: 1
GASTROINTESTINAL NEGATIVE: 1
ENDOCRINE NEGATIVE: 1
HEMATOLOGIC/LYMPHATIC NEGATIVE: 1
MUSCULOSKELETAL NEGATIVE: 1
RESPIRATORY NEGATIVE: 1
CARDIOVASCULAR NEGATIVE: 1
EYES NEGATIVE: 1
NEUROLOGICAL NEGATIVE: 1
PSYCHIATRIC NEGATIVE: 1
CONSTITUTIONAL NEGATIVE: 1

## 2025-04-09 ASSESSMENT — PAIN SCALES - GENERAL
PAIN_LEVEL: 0
PAINLEVEL_OUTOF10: 0 - NO PAIN

## 2025-04-09 ASSESSMENT — PAIN - FUNCTIONAL ASSESSMENT
PAIN_FUNCTIONAL_ASSESSMENT: 0-10

## 2025-04-09 NOTE — H&P
History Of Present Illness  Jaky Kaufman is a 69 y.o. female presenting with visually significant cataract .     Past Medical History  She has a past medical history of Cataract and PONV (postoperative nausea and vomiting).    Surgical History  She has a past surgical history that includes Tubal ligation and Colonoscopy.     Social History  She reports that she has quit smoking. Her smoking use included cigarettes. She has never been exposed to tobacco smoke. She has never used smokeless tobacco. She reports current alcohol use. She reports current drug use. Frequency: 2.00 times per week. Drug: Marijuana.     Allergies  Cat dander and House dust    ROS  Patient denies ocular pain, redness, discharge, decreased vision, double vision, blind spots, flashes, or floaters.     Physical Exam  Not recorded          Assessment/Plan       Cataract extraction with lens implant        I spent 5 minutes in the professional and overall care of this patient.      Deborah Ruff MD

## 2025-04-09 NOTE — H&P
"History Of Present Illness  Jaky Kaufman is a 69 y.o. female presenting with visually significant cataract .     Past Medical History  Past Medical History:   Diagnosis Date    Cataract     PONV (postoperative nausea and vomiting)        Surgical History  Past Surgical History:   Procedure Laterality Date    COLONOSCOPY      TUBAL LIGATION          Social History  She reports that she has quit smoking. Her smoking use included cigarettes. She has never been exposed to tobacco smoke. She has never used smokeless tobacco. She reports current alcohol use. She reports current drug use. Frequency: 2.00 times per week. Drug: Marijuana.    Family History  Family History   Problem Relation Name Age of Onset    Hypertension Mother      Diabetes Brother      Glaucoma Neg Hx      Macular degeneration Neg Hx          Allergies  Cat dander and House dust    Review of Systems   Constitutional: Negative.    HENT: Negative.     Eyes: Negative.    Respiratory: Negative.     Cardiovascular: Negative.    Gastrointestinal: Negative.    Endocrine: Negative.    Genitourinary: Negative.    Musculoskeletal: Negative.    Skin: Negative.    Allergic/Immunologic: Negative.    Neurological: Negative.    Hematological: Negative.    Psychiatric/Behavioral: Negative.     All other systems reviewed and are negative.       Physical Exam  HENT:      Head: Normocephalic.   Cardiovascular:      Rate and Rhythm: Normal rate and regular rhythm.   Pulmonary:      Effort: Pulmonary effort is normal.          Last Recorded Vitals  Blood pressure 169/84, pulse 94, temperature 36.8 °C (98.2 °F), temperature source Tympanic, resp. rate 16, height 1.549 m (5' 1\"), weight 59.4 kg (130 lb 15.3 oz), SpO2 99%.    Relevant Results        No current facility-administered medications on file prior to encounter.     Current Outpatient Medications on File Prior to Encounter   Medication Sig Dispense Refill    cetirizine (ZyrTEC) 10 mg tablet Take 1 tablet (10 mg) by " mouth once daily. (Patient not taking: Reported on 4/1/2025) 30 tablet 0    fluticasone (Flonase) 50 mcg/actuation nasal spray Administer 2 sprays into affected nostril(s) once daily. (Patient not taking: Reported on 3/17/2025)           Assessment/Plan   Assessment & Plan      Cataract extraction with lens implant        I spent 5 minutes in the professional and overall care of this patient.      Deborah Ruff MD

## 2025-04-09 NOTE — OP NOTE
Extraction Cataract with Insertion Intraocular Lens (R) Operative Note     Date: 2025  OR Location: [facility] OR    Name: Jaky Kaufman, : 1955, Age: 69 y.o., MRN: [id number], Sex: female    Diagnosis  Pre-op Diagnosis      * Combined form of age-related cataract, right eye [H25.811] Post-op Diagnosis     * Combined form of age-related cataract, right eye [H25.811]     Procedures  Extraction Cataract with Insertion Intraocular Lens  10551 - ND XCAPSL CTRC RMVL INSJ IO LENS PROSTH W/O ECP      Surgeons      * Deborah Ruff - Primary    Resident/Fellow/Other Assistant:  Surgeons and Role:  * No surgeons found with a matching role *    Staff:   Rajiulator: Autumn Regan Person: Gabbie    Anesthesia Staff: Anesthesiologist: Eliza Gonzalez MD  CRNA: NITIN Hager-CRNA    Procedure Summary  Anesthesia: Anesthesia type not filed in the log.  ASA: ASA status not filed in the log.  Estimated Blood Loss: 0mL  Intra-op Medications:   Administrations occurring from 1229 to 1310 on 25:   Medication Name Total Dose   mannitol 20 % IV 12.5 g Cannot be calculated              Anesthesia Record               Intraprocedure I/O Totals          Intake    sodium chloride 0.9 % flush 10.00 mL    Total Intake 10 mL       Output    Est. Blood Loss 0 mL    Total Output 0 mL       Net    Net Volume 10 mL          Specimen: No specimens collected              Drains and/or Catheters: * None in log *    Tourniquet Times:         Implants:  Implants       Type Name Action Serial No.       19.5 DIOPTER, TECNIS EYHANCE 1-PIECE IOL W/ SIMPLICITY DELIVERY SYSTEM, BICONVEX, UV-BLOCKING HYDROPHOBIC ACRYLIC, MODEL DIB00 Implanted [id number]              Findings: visually significant cataract     Indications: Jaky Kaufman is an 69 y.o. female who is having surgery for Combined form of age-related cataract, right eye [H25.811].     The patient was seen in the preoperative area. The risks, benefits, complications,  treatment options, non-operative alternatives, expected recovery and outcomes were discussed with the patient. The possibilities of reaction to medication, pulmonary aspiration, injury to surrounding structures, bleeding, recurrent infection, the need for additional procedures, failure to diagnose a condition, and creating a complication requiring transfusion or operation were discussed with the patient. The patient concurred with the proposed plan, giving informed consent.  The site of surgery was properly noted/marked if necessary per policy. The patient has been actively warmed in preoperative area. Preoperative antibiotics are not indicated. Venous thrombosis prophylaxis are not indicated.    Procedure Details: The patient was prepped and draped in a sterile fashion. the lid speculum was placed in the  eye.  a paracentesis was made and preservative free lidocaine was injected. Trypan blue was injected for anterior capsule staining due to poor red reflex. Viscoat was injected into to anterior chamber. A temporal incision was made. A capsulorhexus was made. Hydrodissection was done. Phacoemulsification was used to sculpt, divide and remove the nucleus. Cortex was removed with irrigation/aspiration. Provisc was injected into the capsular bag. The lens implant, model DI B00 diopter     19.5  was injected and rotated into position. Viscoelastic was removed and the incision was hydrated and  demonstrated to be watertight. The patient tolerated the procedure well and there were no complications. Follow up in 1 day CDE:  4.75   Complications:  None; patient tolerated the procedure well.    Disposition: PACU - hemodynamically stable.  Condition: stable                 Additional Details:     Attending Attestation:     Deborah Ruff  Phone Number: [phone]

## 2025-04-09 NOTE — ANESTHESIA PREPROCEDURE EVALUATION
Patient: Jaky Kaufman    Procedure Information       Anesthesia Start Date/Time: 04/09/25 1321    Procedure: Extraction Cataract with Insertion Intraocular Lens (Right: Eye)    [facility] / [facility]    Surgeons: Deborah Ruff MD            Relevant Problems   No relevant active problems       Clinical information reviewed:   Tobacco  Allergies  Meds   Med Hx  Surg Hx  OB Status  Fam Hx  Soc   Hx    (+) marijuana use/occassional ETOH    NPO Detail:  NPO/Void Status  Date of Last Liquid: 04/08/25  Time of Last Liquid: 1700  Date of Last Solid: 04/08/25  Time of Last Solid: 1700  Last Intake Type: Clear fluids         Physical Exam    Airway  Mallampati: I  TM distance: >3 FB  Neck ROM: full     Cardiovascular   Rhythm: regular  Rate: normal     Dental - normal exam     Pulmonary - normal exam     Abdominal - normal exam             Anesthesia Plan    History of general anesthesia?: yes  History of complications of general anesthesia?: no    ASA 2     MAC     The patient is a current smoker.  Patient did not smoke on day of procedure.    intravenous induction   Postoperative administration of opioids is intended.  Anesthetic plan and risks discussed with patient.  Use of blood products discussed with patient who consented to blood products.    Plan discussed with CRNA.

## 2025-04-09 NOTE — ANESTHESIA POSTPROCEDURE EVALUATION
Patient: Jaky Kuafman    Procedure Summary       Date: 04/09/25 Room / Location: [facility] [place] [facility] 03 / [facility] [place] [facility]    Anesthesia Start: 1321 Anesthesia Stop: 1352    Procedure: Extraction Cataract with Insertion Intraocular Lens (Right: Eye) Diagnosis:       Combined form of age-related cataract, right eye      (Combined form of age-related cataract, right eye [H25.811])    Surgeons: Deborah Ruff MD Responsible Provider: Eliza Gonzalez MD    Anesthesia Type: MAC ASA Status: 2            Anesthesia Type: MAC    Vitals Value Taken Time   /76 04/09/25 1420   Temp 37.7 °C (99.9 °F) 04/09/25 1405   Pulse 88 04/09/25 1420   Resp 16 04/09/25 1420   SpO2 98 % 04/09/25 1420       Anesthesia Post Evaluation    Patient location during evaluation: PACU  Patient participation: complete - patient participated  Level of consciousness: awake  Pain score: 0  Pain management: adequate  Airway patency: patent  Cardiovascular status: acceptable  Respiratory status: acceptable  Hydration status: acceptable  Postoperative Nausea and Vomiting: none        No notable events documented.

## 2025-04-09 NOTE — DISCHARGE INSTRUCTIONS
Use all 3 drops 4 times today  No heavy lifting  Glasses or sheild during the day, wear the sheild to bed  Keep eye dry    Ofloxacin (tan top) antibiotic, use 4 times a day   Stop after 1 week  Ketorolac   (Gray top)  Use 4 times a day until gone  Prednisilone (pink top)  Use 4 times a day for one week, then decrease to          3 times a day for one week, then decrease to         2 times a day for one week, then decrease to         1 time a day for 1 week    Shield at bedtime for 1 week  Call [phone] for any concerns  In emergency or after hours, call [phone] to have on call paged

## 2025-04-10 ENCOUNTER — HOSPITAL ENCOUNTER (OUTPATIENT)
Facility: CLINIC | Age: 70
Setting detail: OUTPATIENT SURGERY
End: 2025-04-10
Attending: OPHTHALMOLOGY | Admitting: OPHTHALMOLOGY

## 2025-04-10 ENCOUNTER — OFFICE VISIT (OUTPATIENT)
Dept: OPHTHALMOLOGY | Facility: CLINIC | Age: 70
End: 2025-04-10
Payer: COMMERCIAL

## 2025-04-10 ENCOUNTER — PREP FOR PROCEDURE (OUTPATIENT)
Dept: OPHTHALMOLOGY | Facility: CLINIC | Age: 70
End: 2025-04-10

## 2025-04-10 DIAGNOSIS — H25.812 COMBINED FORM OF AGE-RELATED CATARACT, LEFT EYE: Primary | ICD-10-CM

## 2025-04-10 DIAGNOSIS — Z96.1 PSEUDOPHAKIA, RIGHT EYE: ICD-10-CM

## 2025-04-10 PROCEDURE — 99024 POSTOP FOLLOW-UP VISIT: CPT | Performed by: OPHTHALMOLOGY

## 2025-04-10 RX ORDER — KETOROLAC TROMETHAMINE 5 MG/ML
1 SOLUTION OPHTHALMIC 4 TIMES DAILY
Qty: 5 ML | Refills: 1 | Status: SHIPPED | OUTPATIENT
Start: 2025-04-10 | End: 2025-04-25

## 2025-04-10 RX ORDER — PHENYLEPHRINE HYDROCHLORIDE 25 MG/ML
1 SOLUTION/ DROPS OPHTHALMIC
OUTPATIENT
Start: 2025-04-10 | End: 2025-04-10

## 2025-04-10 RX ORDER — OFLOXACIN 3 MG/ML
1 SOLUTION/ DROPS OPHTHALMIC 4 TIMES DAILY
Qty: 10 ML | Refills: 1 | Status: SHIPPED | OUTPATIENT
Start: 2025-04-10 | End: 2025-04-18

## 2025-04-10 RX ORDER — TROPICAMIDE 10 MG/ML
1 SOLUTION/ DROPS OPHTHALMIC
OUTPATIENT
Start: 2025-04-10 | End: 2025-04-10

## 2025-04-10 RX ORDER — PROPARACAINE HYDROCHLORIDE 5 MG/ML
1 SOLUTION/ DROPS OPHTHALMIC ONCE
OUTPATIENT
Start: 2025-04-10 | End: 2025-04-10

## 2025-04-10 RX ORDER — PREDNISOLONE ACETATE 10 MG/ML
1 SUSPENSION/ DROPS OPHTHALMIC 4 TIMES DAILY
Qty: 5 ML | Refills: 1 | Status: SHIPPED | OUTPATIENT
Start: 2025-04-10 | End: 2025-04-18

## 2025-04-10 ASSESSMENT — PAIN SCALES - GENERAL: PAINLEVEL_OUTOF10: 0 - NO PAIN

## 2025-04-10 ASSESSMENT — ENCOUNTER SYMPTOMS: EYES NEGATIVE: 1

## 2025-04-10 ASSESSMENT — VISUAL ACUITY
OD_SC: 20/30
METHOD: SNELLEN - LINEAR
OD_SC+: -2

## 2025-04-10 ASSESSMENT — SLIT LAMP EXAM - LIDS
COMMENTS: NORMAL
COMMENTS: NORMAL

## 2025-04-10 ASSESSMENT — PACHYMETRY
OS_CT(UM): 584
OD_CT(UM): 581

## 2025-04-10 ASSESSMENT — TONOMETRY
OD_IOP_MMHG: 21
IOP_METHOD: GOLDMANN APPLANATION

## 2025-04-10 ASSESSMENT — EXTERNAL EXAM - LEFT EYE: OS_EXAM: NORMAL

## 2025-04-10 ASSESSMENT — EXTERNAL EXAM - RIGHT EYE: OD_EXAM: NORMAL

## 2025-04-10 NOTE — H&P
History Of Present Illness  Jaky Kaufman is a 69 y.o. female presenting with visually significant cataract .     Past Medical History  She has a past medical history of Cataract and PONV (postoperative nausea and vomiting).    Surgical History  She has a past surgical history that includes Tubal ligation and Colonoscopy.     Social History  She reports that she has quit smoking. Her smoking use included cigarettes. She has never been exposed to tobacco smoke. She has never used smokeless tobacco. She reports current alcohol use. She reports current drug use. Frequency: 2.00 times per week. Drug: Marijuana.     Allergies  Cat dander and House dust    ROS  Patient denies ocular pain, redness, discharge, decreased vision, double vision, blind spots, flashes, or floaters.     Physical Exam  Not recorded          Assessment/Plan       Cataract extraction with lens implant        I spent  minutes in the professional and overall care of this patient.      Deborah Ruff MD

## 2025-04-10 NOTE — PATIENT INSTRUCTIONS
Use all 3 drops 4 times a day for 1 week  Ofloxacin (tan top) antibiotic, use 4 times a day   Stop after 1 week  Ketorolac   (Gray top)  Use 4 times a day until gone  Prednisilone (pink top)  Use 4 times a day for one week, then decrease to          3 times a day for one week, then decrease to         2 times a day for one week, then decrease to         1 time a day for 1 week    Shield at bedtime for 1 week  Call [phone] for any concerns  In emergency or after hours, call [phone] to have on call paged

## 2025-04-10 NOTE — PROGRESS NOTES
Assessment/Plan   Diagnoses and all orders for this visit:  Combined form of age-related cataract, left eye     Visually significant cataract OS.     Indication for cataract surgery: Input To potentially improve visual acuity and improve quality of life/reduce symptoms.   Based on a comprehensive eye exam performed today, a visually significant cataract appears to be the source of decreased vision, diminished quality of life, and impairment of activities of daily living.   Discussed option of cataract surgery vs observation. Patient  wishes to have cataract surgery at this time.   Discussed surgical procedure with patient.   As a result of cataract extraction, it is believed that the patient will experience improved vision. Discussed potential risks, benefits, and complications of cataract surgery including but not limited to pain, bleeding, infection, inflammation, edema, increased eye pressure, retinal tear/detachment, lens dislocation, ptosis, iris damage, need for additional surgery, need for glasses after surgery, loss of vision/loss of eye. Patient understands and wishes to proceed. All questions were answered.   Will schedule cataract surgery OS.   Discussed IOL options (standard monofocal, monofocal with monovision, toric, multifocal).   Lens chosen: standard monofocal, target -1.25  Defer/decline toric/multifocal lens at this time.   Dominance:   Special considerations: TB  Best contact number: [phone]  Drops:   -Ketorolac and Ofloxacin 4x/day starting 1 day prior to surgery; Prednisolone acetate 1% 4x/day starting after surgery   Discussed that may potentially need glasses for best vision both at distance and at near.    I personally reviewed the lenstar measurements and will choose the lens accordingly.   Pseudophakia, right eye

## 2025-05-13 ENCOUNTER — APPOINTMENT (OUTPATIENT)
Dept: OPHTHALMOLOGY | Facility: CLINIC | Age: 70
End: 2025-05-13
Payer: COMMERCIAL

## (undated) DEVICE — DRESSING, TRANSPARENT, TEGADERM, 2-3/8 X 2-3/4 IN

## (undated) DEVICE — KNIFE, OPHTHALMIC, CRESCENT, ANGLED, BEVEL UP, SATIN

## (undated) DEVICE — CANNULA, HYDRODISSECTION, MICRO, 25 G X 8 MM

## (undated) DEVICE — Device

## (undated) DEVICE — GLOVE, SURGICAL, PROTEXIS PI , 6.5, PF, LF

## (undated) DEVICE — HANDPIECE,  IRRIGATION/ASPIRATION, 45DEG, 2.2MM-2.8MM, BLUE

## (undated) DEVICE — NEEDLE, HYPODERMIC, REGULAR WALL, REGULAR BEVEL, 18 G X 1.5 IN